# Patient Record
Sex: FEMALE | Race: WHITE | NOT HISPANIC OR LATINO | Employment: OTHER | URBAN - METROPOLITAN AREA
[De-identification: names, ages, dates, MRNs, and addresses within clinical notes are randomized per-mention and may not be internally consistent; named-entity substitution may affect disease eponyms.]

---

## 2019-02-28 ENCOUNTER — HOSPITAL ENCOUNTER (INPATIENT)
Facility: HOSPITAL | Age: 84
LOS: 6 days | Discharge: HOME | End: 2019-03-06
Attending: HOSPITALIST | Admitting: INTERNAL MEDICINE

## 2019-02-28 RX ORDER — LORATADINE 10 MG/1
10 TABLET ORAL DAILY
COMMUNITY
End: 2021-06-05

## 2019-02-28 RX ORDER — HYDROCHLOROTHIAZIDE 12.5 MG/1
12.5 TABLET ORAL DAILY
COMMUNITY
End: 2021-06-05

## 2019-02-28 RX ORDER — TAMOXIFEN CITRATE 20 MG/1
20 TABLET ORAL DAILY
COMMUNITY

## 2019-02-28 RX ORDER — IBUPROFEN 200 MG
200 TABLET ORAL EVERY 6 HOURS PRN
COMMUNITY
End: 2019-03-06 | Stop reason: HOSPADM

## 2019-03-01 ENCOUNTER — APPOINTMENT (OUTPATIENT)
Dept: GENERAL RADIOLOGY | Facility: HOSPITAL | Age: 84
End: 2019-03-01

## 2019-03-01 PROBLEM — A41.9 SEPSIS (HCC): Status: ACTIVE | Noted: 2019-03-01

## 2019-03-01 LAB
ALBUMIN SERPL-MCNC: 3.4 G/DL (ref 3.5–5.2)
ALBUMIN/GLOB SERPL: 1.3 G/DL
ALP SERPL-CCNC: 53 U/L (ref 40–129)
ALT SERPL W P-5'-P-CCNC: 12 U/L (ref 5–33)
ANION GAP SERPL CALCULATED.3IONS-SCNC: 9.5 MMOL/L
AST SERPL-CCNC: 15 U/L (ref 5–32)
B PARAPERT DNA SPEC QL NAA+PROBE: NOT DETECTED
B PERT DNA SPEC QL NAA+PROBE: NOT DETECTED
BASOPHILS # BLD AUTO: 0.02 10*3/MM3 (ref 0–0.2)
BASOPHILS # BLD AUTO: 0.02 10*3/MM3 (ref 0–0.2)
BASOPHILS NFR BLD AUTO: 0.1 % (ref 0–1.5)
BASOPHILS NFR BLD AUTO: 0.2 % (ref 0–1.5)
BILIRUB SERPL-MCNC: 0.5 MG/DL (ref 0.2–1.2)
BUN BLD-MCNC: 21 MG/DL (ref 8–23)
BUN/CREAT SERPL: 16.4 (ref 7–25)
C PNEUM DNA NPH QL NAA+NON-PROBE: NOT DETECTED
CALCIUM SPEC-SCNC: 8 MG/DL (ref 8.8–10.5)
CHLORIDE SERPL-SCNC: 106 MMOL/L (ref 98–107)
CO2 SERPL-SCNC: 24.5 MMOL/L (ref 22–29)
CREAT BLD-MCNC: 1.28 MG/DL (ref 0.57–1)
D-LACTATE SERPL-SCNC: 1.5 MMOL/L (ref 0.5–2)
DEPRECATED RDW RBC AUTO: 44.3 FL (ref 37–54)
DEPRECATED RDW RBC AUTO: 44.5 FL (ref 37–54)
EOSINOPHIL # BLD AUTO: 0 10*3/MM3 (ref 0–0.4)
EOSINOPHIL # BLD AUTO: 0.07 10*3/MM3 (ref 0–0.4)
EOSINOPHIL NFR BLD AUTO: 0 % (ref 0.3–6.2)
EOSINOPHIL NFR BLD AUTO: 0.6 % (ref 0.3–6.2)
ERYTHROCYTE [DISTWIDTH] IN BLOOD BY AUTOMATED COUNT: 12.7 % (ref 12.3–15.4)
ERYTHROCYTE [DISTWIDTH] IN BLOOD BY AUTOMATED COUNT: 12.9 % (ref 12.3–15.4)
FLUAV H1 2009 PAND RNA NPH QL NAA+PROBE: NOT DETECTED
FLUAV H1 HA GENE NPH QL NAA+PROBE: NOT DETECTED
FLUAV H3 RNA NPH QL NAA+PROBE: NOT DETECTED
FLUAV SUBTYP SPEC NAA+PROBE: NOT DETECTED
FLUBV RNA ISLT QL NAA+PROBE: NOT DETECTED
GFR SERPL CREATININE-BSD FRML MDRD: 39 ML/MIN/1.73
GLOBULIN UR ELPH-MCNC: 2.7 GM/DL
GLUCOSE BLD-MCNC: 143 MG/DL (ref 65–99)
GLUCOSE BLDC GLUCOMTR-MCNC: 104 MG/DL (ref 70–130)
GLUCOSE BLDC GLUCOMTR-MCNC: 147 MG/DL (ref 70–130)
GLUCOSE BLDC GLUCOMTR-MCNC: 148 MG/DL (ref 70–130)
GLUCOSE BLDC GLUCOMTR-MCNC: 97 MG/DL (ref 70–130)
GLUCOSE BLDC GLUCOMTR-MCNC: 99 MG/DL (ref 70–130)
HADV DNA SPEC NAA+PROBE: NOT DETECTED
HBA1C MFR BLD: 5.5 % (ref 4.8–5.6)
HCOV 229E RNA SPEC QL NAA+PROBE: NOT DETECTED
HCOV HKU1 RNA SPEC QL NAA+PROBE: NOT DETECTED
HCOV NL63 RNA SPEC QL NAA+PROBE: NOT DETECTED
HCOV OC43 RNA SPEC QL NAA+PROBE: NOT DETECTED
HCT VFR BLD AUTO: 35 % (ref 34–46.6)
HCT VFR BLD AUTO: 37.6 % (ref 34–46.6)
HGB BLD-MCNC: 11.3 G/DL (ref 12–15.9)
HGB BLD-MCNC: 12.1 G/DL (ref 12–15.9)
HMPV RNA NPH QL NAA+NON-PROBE: NOT DETECTED
HPIV1 RNA SPEC QL NAA+PROBE: NOT DETECTED
HPIV2 RNA SPEC QL NAA+PROBE: NOT DETECTED
HPIV3 RNA NPH QL NAA+PROBE: NOT DETECTED
HPIV4 P GENE NPH QL NAA+PROBE: NOT DETECTED
IMM GRANULOCYTES # BLD AUTO: 0.03 10*3/MM3 (ref 0–0.05)
IMM GRANULOCYTES # BLD AUTO: 0.06 10*3/MM3 (ref 0–0.05)
IMM GRANULOCYTES NFR BLD AUTO: 0.3 % (ref 0–0.5)
IMM GRANULOCYTES NFR BLD AUTO: 0.4 % (ref 0–0.5)
LYMPHOCYTES # BLD AUTO: 1.21 10*3/MM3 (ref 0.7–3.1)
LYMPHOCYTES # BLD AUTO: 1.5 10*3/MM3 (ref 0.7–3.1)
LYMPHOCYTES NFR BLD AUTO: 13 % (ref 19.6–45.3)
LYMPHOCYTES NFR BLD AUTO: 7.9 % (ref 19.6–45.3)
M PNEUMO IGG SER IA-ACNC: NOT DETECTED
MAGNESIUM SERPL-MCNC: 1.9 MG/DL (ref 1.7–2.5)
MAGNESIUM SERPL-MCNC: 2 MG/DL (ref 1.7–2.5)
MCH RBC QN AUTO: 30.6 PG (ref 26.6–33)
MCH RBC QN AUTO: 30.7 PG (ref 26.6–33)
MCHC RBC AUTO-ENTMCNC: 32.2 G/DL (ref 31.5–35.7)
MCHC RBC AUTO-ENTMCNC: 32.3 G/DL (ref 31.5–35.7)
MCV RBC AUTO: 94.9 FL (ref 79–97)
MCV RBC AUTO: 95.4 FL (ref 79–97)
MONOCYTES # BLD AUTO: 0.72 10*3/MM3 (ref 0.1–0.9)
MONOCYTES # BLD AUTO: 0.76 10*3/MM3 (ref 0.1–0.9)
MONOCYTES NFR BLD AUTO: 4.7 % (ref 5–12)
MONOCYTES NFR BLD AUTO: 6.6 % (ref 5–12)
NEUTROPHILS # BLD AUTO: 13.24 10*3/MM3 (ref 1.4–7)
NEUTROPHILS # BLD AUTO: 9.12 10*3/MM3 (ref 1.4–7)
NEUTROPHILS NFR BLD AUTO: 79.3 % (ref 42.7–76)
NEUTROPHILS NFR BLD AUTO: 86.9 % (ref 42.7–76)
NRBC BLD AUTO-RTO: 0 /100 WBC (ref 0–0)
NRBC BLD AUTO-RTO: 0 /100 WBC (ref 0–0)
PHOSPHATE SERPL-MCNC: 2.8 MG/DL (ref 2.7–4.5)
PHOSPHATE SERPL-MCNC: 2.9 MG/DL (ref 2.7–4.5)
PLATELET # BLD AUTO: 179 10*3/MM3 (ref 140–450)
PLATELET # BLD AUTO: 202 10*3/MM3 (ref 140–450)
PMV BLD AUTO: 9.6 FL (ref 6–12)
PMV BLD AUTO: 9.9 FL (ref 6–12)
POTASSIUM BLD-SCNC: 4.2 MMOL/L (ref 3.5–5.2)
PROCALCITONIN SERPL-MCNC: 2.26 NG/ML (ref 0.1–0.25)
PROT SERPL-MCNC: 6.1 G/DL (ref 6–8.5)
RBC # BLD AUTO: 3.69 10*6/MM3 (ref 3.77–5.28)
RBC # BLD AUTO: 3.94 10*6/MM3 (ref 3.77–5.28)
RHINOVIRUS RNA SPEC NAA+PROBE: NOT DETECTED
RSV RNA NPH QL NAA+NON-PROBE: NOT DETECTED
SODIUM BLD-SCNC: 140 MMOL/L (ref 136–145)
TROPONIN T SERPL-MCNC: <0.01 NG/ML (ref 0–0.03)
TROPONIN T SERPL-MCNC: <0.01 NG/ML (ref 0–0.03)
WBC NRBC COR # BLD: 11.5 10*3/MM3 (ref 3.4–10.8)
WBC NRBC COR # BLD: 15.25 10*3/MM3 (ref 3.4–10.8)

## 2019-03-01 PROCEDURE — 94640 AIRWAY INHALATION TREATMENT: CPT

## 2019-03-01 PROCEDURE — 83605 ASSAY OF LACTIC ACID: CPT | Performed by: HOSPITALIST

## 2019-03-01 PROCEDURE — 87581 M.PNEUMON DNA AMP PROBE: CPT | Performed by: HOSPITALIST

## 2019-03-01 PROCEDURE — 87631 RESP VIRUS 3-5 TARGETS: CPT | Performed by: HOSPITALIST

## 2019-03-01 PROCEDURE — 84484 ASSAY OF TROPONIN QUANT: CPT | Performed by: HOSPITALIST

## 2019-03-01 PROCEDURE — 25010000002 ONDANSETRON PER 1 MG: Performed by: HOSPITALIST

## 2019-03-01 PROCEDURE — 25010000002 ENOXAPARIN PER 10 MG: Performed by: HOSPITALIST

## 2019-03-01 PROCEDURE — 83735 ASSAY OF MAGNESIUM: CPT | Performed by: HOSPITALIST

## 2019-03-01 PROCEDURE — 74019 RADEX ABDOMEN 2 VIEWS: CPT

## 2019-03-01 PROCEDURE — 85025 COMPLETE CBC W/AUTO DIFF WBC: CPT | Performed by: HOSPITALIST

## 2019-03-01 PROCEDURE — 25010000002 HYDROMORPHONE PER 4 MG: Performed by: HOSPITALIST

## 2019-03-01 PROCEDURE — 84100 ASSAY OF PHOSPHORUS: CPT | Performed by: HOSPITALIST

## 2019-03-01 PROCEDURE — 93005 ELECTROCARDIOGRAM TRACING: CPT | Performed by: HOSPITALIST

## 2019-03-01 PROCEDURE — 87798 DETECT AGENT NOS DNA AMP: CPT | Performed by: HOSPITALIST

## 2019-03-01 PROCEDURE — 87486 CHLMYD PNEUM DNA AMP PROBE: CPT | Performed by: HOSPITALIST

## 2019-03-01 PROCEDURE — 84145 PROCALCITONIN (PCT): CPT | Performed by: HOSPITALIST

## 2019-03-01 PROCEDURE — 80053 COMPREHEN METABOLIC PANEL: CPT | Performed by: HOSPITALIST

## 2019-03-01 PROCEDURE — 25010000002 LEVOFLOXACIN PER 250 MG: Performed by: HOSPITALIST

## 2019-03-01 PROCEDURE — 82962 GLUCOSE BLOOD TEST: CPT

## 2019-03-01 PROCEDURE — 94799 UNLISTED PULMONARY SVC/PX: CPT

## 2019-03-01 PROCEDURE — 93010 ELECTROCARDIOGRAM REPORT: CPT | Performed by: INTERNAL MEDICINE

## 2019-03-01 PROCEDURE — 99221 1ST HOSP IP/OBS SF/LOW 40: CPT | Performed by: SURGERY

## 2019-03-01 PROCEDURE — 83036 HEMOGLOBIN GLYCOSYLATED A1C: CPT | Performed by: HOSPITALIST

## 2019-03-01 PROCEDURE — 99221 1ST HOSP IP/OBS SF/LOW 40: CPT | Performed by: HOSPITALIST

## 2019-03-01 PROCEDURE — 25010000002 VANCOMYCIN 750 MG RECONSTITUTED SOLUTION 1 EACH VIAL: Performed by: HOSPITALIST

## 2019-03-01 PROCEDURE — 87040 BLOOD CULTURE FOR BACTERIA: CPT | Performed by: HOSPITALIST

## 2019-03-01 RX ORDER — ASPIRIN 81 MG/1
81 TABLET ORAL DAILY
Status: DISCONTINUED | OUTPATIENT
Start: 2019-03-01 | End: 2019-03-01

## 2019-03-01 RX ORDER — BUDESONIDE AND FORMOTEROL FUMARATE DIHYDRATE 80; 4.5 UG/1; UG/1
2 AEROSOL RESPIRATORY (INHALATION)
Status: DISCONTINUED | OUTPATIENT
Start: 2019-03-01 | End: 2019-03-06 | Stop reason: HOSPADM

## 2019-03-01 RX ORDER — FAMOTIDINE 20 MG/1
20 TABLET, FILM COATED ORAL 2 TIMES DAILY
Status: DISCONTINUED | OUTPATIENT
Start: 2019-03-01 | End: 2019-03-01

## 2019-03-01 RX ORDER — POLYVINYL ALCOHOL 14 MG/ML
1 SOLUTION/ DROPS OPHTHALMIC 2 TIMES DAILY
Status: DISCONTINUED | OUTPATIENT
Start: 2019-03-01 | End: 2019-03-06 | Stop reason: HOSPADM

## 2019-03-01 RX ORDER — CLOBETASOL PROPIONATE 0.5 MG/G
1 CREAM TOPICAL 2 TIMES DAILY PRN
Status: DISCONTINUED | OUTPATIENT
Start: 2019-03-01 | End: 2019-03-06 | Stop reason: HOSPADM

## 2019-03-01 RX ORDER — MONTELUKAST SODIUM 10 MG/1
10 TABLET ORAL NIGHTLY
Status: DISCONTINUED | OUTPATIENT
Start: 2019-03-01 | End: 2019-03-01

## 2019-03-01 RX ORDER — TAMOXIFEN CITRATE 10 MG/1
20 TABLET ORAL DAILY
Status: DISCONTINUED | OUTPATIENT
Start: 2019-03-01 | End: 2019-03-01

## 2019-03-01 RX ORDER — DEXTROSE MONOHYDRATE 25 G/50ML
25 INJECTION, SOLUTION INTRAVENOUS
Status: DISCONTINUED | OUTPATIENT
Start: 2019-03-01 | End: 2019-03-06 | Stop reason: HOSPADM

## 2019-03-01 RX ORDER — SODIUM CHLORIDE 0.9 % (FLUSH) 0.9 %
3-10 SYRINGE (ML) INJECTION AS NEEDED
Status: DISCONTINUED | OUTPATIENT
Start: 2019-03-01 | End: 2019-03-06 | Stop reason: HOSPADM

## 2019-03-01 RX ORDER — LEVOFLOXACIN 5 MG/ML
750 INJECTION, SOLUTION INTRAVENOUS
Status: DISCONTINUED | OUTPATIENT
Start: 2019-03-01 | End: 2019-03-03

## 2019-03-01 RX ORDER — SODIUM CHLORIDE 0.9 % (FLUSH) 0.9 %
3 SYRINGE (ML) INJECTION EVERY 12 HOURS SCHEDULED
Status: DISCONTINUED | OUTPATIENT
Start: 2019-03-01 | End: 2019-03-06 | Stop reason: HOSPADM

## 2019-03-01 RX ORDER — TAMOXIFEN CITRATE 20 MG/1
20 TABLET ORAL DAILY
Status: DISCONTINUED | OUTPATIENT
Start: 2019-03-01 | End: 2019-03-01

## 2019-03-01 RX ORDER — ONDANSETRON 4 MG/1
4 TABLET, ORALLY DISINTEGRATING ORAL EVERY 6 HOURS PRN
Status: DISCONTINUED | OUTPATIENT
Start: 2019-03-01 | End: 2019-03-06 | Stop reason: HOSPADM

## 2019-03-01 RX ORDER — NITROGLYCERIN 0.4 MG/1
0.4 TABLET SUBLINGUAL
Status: DISCONTINUED | OUTPATIENT
Start: 2019-03-01 | End: 2019-03-06 | Stop reason: HOSPADM

## 2019-03-01 RX ORDER — CHOLECALCIFEROL (VITAMIN D3) 125 MCG
5 CAPSULE ORAL NIGHTLY PRN
Status: DISCONTINUED | OUTPATIENT
Start: 2019-03-01 | End: 2019-03-01

## 2019-03-01 RX ORDER — BISACODYL 10 MG
10 SUPPOSITORY, RECTAL RECTAL DAILY
Status: DISCONTINUED | OUTPATIENT
Start: 2019-03-01 | End: 2019-03-03

## 2019-03-01 RX ORDER — SODIUM CHLORIDE 9 MG/ML
INJECTION, SOLUTION INTRAVENOUS
Status: COMPLETED
Start: 2019-03-01 | End: 2019-03-01

## 2019-03-01 RX ORDER — HYDROMORPHONE HCL 110MG/55ML
0.5 PATIENT CONTROLLED ANALGESIA SYRINGE INTRAVENOUS
Status: DISCONTINUED | OUTPATIENT
Start: 2019-03-01 | End: 2019-03-01

## 2019-03-01 RX ORDER — ONDANSETRON 2 MG/ML
4 INJECTION INTRAMUSCULAR; INTRAVENOUS EVERY 6 HOURS PRN
Status: DISCONTINUED | OUTPATIENT
Start: 2019-03-01 | End: 2019-03-06 | Stop reason: HOSPADM

## 2019-03-01 RX ORDER — ONDANSETRON 4 MG/1
4 TABLET, FILM COATED ORAL EVERY 6 HOURS PRN
Status: DISCONTINUED | OUTPATIENT
Start: 2019-03-01 | End: 2019-03-06 | Stop reason: HOSPADM

## 2019-03-01 RX ORDER — TAMOXIFEN CITRATE 10 MG/1
20 TABLET ORAL DAILY
Status: DISCONTINUED | OUTPATIENT
Start: 2019-03-02 | End: 2019-03-01 | Stop reason: CLARIF

## 2019-03-01 RX ORDER — LEVOTHYROXINE SODIUM 0.05 MG/1
50 TABLET ORAL
Status: DISCONTINUED | OUTPATIENT
Start: 2019-03-01 | End: 2019-03-01

## 2019-03-01 RX ORDER — ACETAMINOPHEN 325 MG/1
650 TABLET ORAL EVERY 4 HOURS PRN
Status: DISCONTINUED | OUTPATIENT
Start: 2019-03-01 | End: 2019-03-01

## 2019-03-01 RX ORDER — CYCLOSPORINE 0.5 MG/ML
1 EMULSION OPHTHALMIC 2 TIMES DAILY
Status: DISCONTINUED | OUTPATIENT
Start: 2019-03-01 | End: 2019-03-01 | Stop reason: CLARIF

## 2019-03-01 RX ORDER — NICOTINE POLACRILEX 4 MG
15 LOZENGE BUCCAL
Status: DISCONTINUED | OUTPATIENT
Start: 2019-03-01 | End: 2019-03-06 | Stop reason: HOSPADM

## 2019-03-01 RX ORDER — ALBUTEROL SULFATE 2.5 MG/3ML
2.5 SOLUTION RESPIRATORY (INHALATION) EVERY 4 HOURS PRN
Status: DISCONTINUED | OUTPATIENT
Start: 2019-03-01 | End: 2019-03-06 | Stop reason: HOSPADM

## 2019-03-01 RX ORDER — SODIUM CHLORIDE 9 MG/ML
40 INJECTION, SOLUTION INTRAVENOUS AS NEEDED
Status: DISCONTINUED | OUTPATIENT
Start: 2019-03-01 | End: 2019-03-06 | Stop reason: HOSPADM

## 2019-03-01 RX ORDER — CETIRIZINE HYDROCHLORIDE 10 MG/1
10 TABLET ORAL DAILY
Status: DISCONTINUED | OUTPATIENT
Start: 2019-03-01 | End: 2019-03-01

## 2019-03-01 RX ORDER — LOSARTAN POTASSIUM 50 MG/1
50 TABLET ORAL DAILY
Status: DISCONTINUED | OUTPATIENT
Start: 2019-03-01 | End: 2019-03-01

## 2019-03-01 RX ORDER — TRAMADOL HYDROCHLORIDE 50 MG/1
50 TABLET ORAL EVERY 6 HOURS PRN
Status: DISCONTINUED | OUTPATIENT
Start: 2019-03-01 | End: 2019-03-06 | Stop reason: HOSPADM

## 2019-03-01 RX ORDER — AZTREONAM 1 G/1
INJECTION, POWDER, LYOPHILIZED, FOR SOLUTION INTRAMUSCULAR; INTRAVENOUS
Status: COMPLETED
Start: 2019-03-01 | End: 2019-03-01

## 2019-03-01 RX ADMIN — AZTREONAM 2 G: 2 INJECTION, POWDER, LYOPHILIZED, FOR SOLUTION INTRAMUSCULAR; INTRAVENOUS at 12:09

## 2019-03-01 RX ADMIN — METRONIDAZOLE 500 MG: 500 INJECTION, SOLUTION INTRAVENOUS at 17:53

## 2019-03-01 RX ADMIN — SODIUM CHLORIDE, PRESERVATIVE FREE 3 ML: 5 INJECTION INTRAVENOUS at 20:50

## 2019-03-01 RX ADMIN — SODIUM CHLORIDE: 900 INJECTION, SOLUTION INTRAVENOUS at 03:19

## 2019-03-01 RX ADMIN — SODIUM CHLORIDE, PRESERVATIVE FREE 3 ML: 5 INJECTION INTRAVENOUS at 08:23

## 2019-03-01 RX ADMIN — BUDESONIDE AND FORMOTEROL FUMARATE DIHYDRATE 2 PUFF: 80; 4.5 AEROSOL RESPIRATORY (INHALATION) at 21:06

## 2019-03-01 RX ADMIN — ACETAMINOPHEN 650 MG: 325 TABLET, FILM COATED ORAL at 03:05

## 2019-03-01 RX ADMIN — LEVOFLOXACIN 750 MG: 5 INJECTION, SOLUTION INTRAVENOUS at 19:31

## 2019-03-01 RX ADMIN — HYDROMORPHONE HYDROCHLORIDE 0.5 MG: 2 INJECTION, SOLUTION INTRAMUSCULAR; INTRAVENOUS; SUBCUTANEOUS at 16:02

## 2019-03-01 RX ADMIN — BISACODYL 10 MG: 10 SUPPOSITORY RECTAL at 16:53

## 2019-03-01 RX ADMIN — LINAGLIPTIN 5 MG: 5 TABLET, FILM COATED ORAL at 08:21

## 2019-03-01 RX ADMIN — BUDESONIDE AND FORMOTEROL FUMARATE DIHYDRATE 2 PUFF: 80; 4.5 AEROSOL RESPIRATORY (INHALATION) at 09:41

## 2019-03-01 RX ADMIN — ASPIRIN 81 MG: 81 TABLET ORAL at 08:21

## 2019-03-01 RX ADMIN — FAMOTIDINE 20 MG: 20 TABLET ORAL at 08:21

## 2019-03-01 RX ADMIN — CETIRIZINE HYDROCHLORIDE 10 MG: 10 TABLET, FILM COATED ORAL at 08:21

## 2019-03-01 RX ADMIN — SODIUM CHLORIDE, PRESERVATIVE FREE 3 ML: 5 INJECTION INTRAVENOUS at 03:07

## 2019-03-01 RX ADMIN — VANCOMYCIN HYDROCHLORIDE 1500 MG: 750 INJECTION, POWDER, LYOPHILIZED, FOR SOLUTION INTRAVENOUS at 08:20

## 2019-03-01 RX ADMIN — TAMOXIFEN CITRATE 20 MG: 20 TABLET ORAL at 12:12

## 2019-03-01 RX ADMIN — CLOBETASOL PROPIONATE 1 APPLICATION: 0.5 CREAM TOPICAL at 08:21

## 2019-03-01 RX ADMIN — POLYVINYL ALCOHOL 1 DROP: 14 SOLUTION/ DROPS OPHTHALMIC at 20:49

## 2019-03-01 RX ADMIN — ENOXAPARIN SODIUM 40 MG: 100 INJECTION SUBCUTANEOUS at 08:21

## 2019-03-01 RX ADMIN — LEVOTHYROXINE SODIUM 50 MCG: 50 TABLET ORAL at 06:03

## 2019-03-01 RX ADMIN — POLYVINYL ALCOHOL 1 DROP: 14 SOLUTION/ DROPS OPHTHALMIC at 08:21

## 2019-03-01 RX ADMIN — TRAMADOL HYDROCHLORIDE 50 MG: 50 TABLET, FILM COATED ORAL at 22:17

## 2019-03-01 RX ADMIN — ONDANSETRON 4 MG: 2 INJECTION, SOLUTION INTRAMUSCULAR; INTRAVENOUS at 16:02

## 2019-03-01 RX ADMIN — MONTELUKAST SODIUM 10 MG: 10 TABLET, FILM COATED ORAL at 03:05

## 2019-03-01 RX ADMIN — AZTREONAM 2 G: 1 INJECTION, POWDER, LYOPHILIZED, FOR SOLUTION INTRAMUSCULAR; INTRAVENOUS at 03:17

## 2019-03-01 RX ADMIN — ACETAMINOPHEN 650 MG: 325 TABLET, FILM COATED ORAL at 14:57

## 2019-03-01 RX ADMIN — LOSARTAN POTASSIUM 50 MG: 50 TABLET ORAL at 08:21

## 2019-03-01 NOTE — H&P
[facility] HOSPITALIST     Derrick Lipscomb MD    CHIEF COMPLAINT: Abdominal pain    HISTORY OF PRESENT ILLNESS:    This patient came to [facility] ER with sudden onset of abdominal pain at home and it gradually worsened as the day went on.  She had spent several hours in the emergency room at [facility] and they felt that they would get a room because their beds were full but she said they could not find her room so they called [facility] for transfer when they realized they could not get her a bed.  Her abdominal pain is mildly improved with the antibiotics that were given.  She is allergic to penicillin.  The CT scan of her abdomen is negative chest x-ray is negative urinalysis was negative but her white blood cell count is elevated.  Lactic acid level is elevated she was febrile and tachycardic.  Patient appears to have sepsis from an unknown organism and sepsis protocol was ordered with aztreonam and vancomycin.      Past Medical History:   Diagnosis Date   • Arthritis    • Asthma    • Breathing problem    • Cancer (CMS/HCC)    • Constipation    • Diabetes (CMS/HCC)    • Fatigue    • GERD (gastroesophageal reflux disease)    • Headache    • History of allergy    • Hypertension    • Irregular bowel habits    • Lichen sclerosus    • Pregnancy, ectopic    • Seizures (CMS/HCC)    • Shortness of breath    • Thyroid trouble      Past Surgical History:   Procedure Laterality Date   • BREAST LUMPECTOMY     • CARDIAC CATHETERIZATION     • COLONOSCOPY     • HEMORRHOIDECTOMY     • HIP SURGERY  2011   • HYSTERECTOMY     • LYMPH NODE BIOPSY      excisional    • MASTECTOMY, PARTIAL Left    • OTHER SURGICAL HISTORY      Closed treatment acromioclavicular dislocation, manipulation   • TUBAL ABDOMINAL LIGATION      when she was 48     Family History   Problem Relation Age of Onset   • Emphysema Father    • Diabetes Other    • Hypertension Other    • Thyroid disease Other      Social History      Tobacco Use   • Smoking status: Never Smoker   Substance Use Topics   • Alcohol use: No   • Drug use: Not on file     Medications Prior to Admission   Medication Sig Dispense Refill Last Dose   • albuterol (PROVENTIL HFA;VENTOLIN HFA) 108 (90 BASE) MCG/ACT inhaler 2 puffs Every 4 (Four) Hours As Needed. Albuterol Sulfate  MCG/ACT AERS; Patient Sig: Albuterol Sulfate  MCG/ACT AERS ; 0; 04-Mar-2015; Active   Taking   • budesonide-formoterol (SYMBICORT) 80-4.5 MCG/ACT inhaler 2 puffs As Needed. Symbicort 80-4.5 MCG/ACT Inhalation Aerosol; Patient Sig: Symbicort 80-4.5 MCG/ACT Inhalation Aerosol ; 0; 23-Oct-2014; Active   Taking   • Cephalexin (KEFLEX PO) Take 500 mg by mouth Daily.   Taking   • clobetasol (TEMOVATE) 0.05 % cream Apply 1 application topically to the appropriate area as directed As Needed.   Taking   • CycloSPORINE (RESTASIS OP) Apply 2 drops to eye(s) as directed by provider As Needed.   Taking   • hydrochlorothiazide (HYDRODIURIL) 12.5 MG tablet Take 12.5 mg by mouth Daily.      • ibuprofen (ADVIL,MOTRIN) 200 MG tablet Take 200 mg by mouth Every 6 (Six) Hours As Needed for Mild Pain .      • levothyroxine (SYNTHROID, LEVOTHROID) 50 MCG tablet Take 50 mcg by mouth Daily.   Taking   • loratadine (CLARITIN) 10 MG tablet Take 10 mg by mouth Daily.      • losartan (COZAAR) 100 MG tablet Take 50 mg by mouth Daily.   Taking   • ranitidine (ZANTAC) 150 MG tablet Take 150 mg by mouth 2 (Two) Times a Day.   Taking   • sitaGLIPtin (JANUVIA) 100 MG tablet Take 100 mg by mouth Daily.   Taking   • tacrolimus (PROTOPIC) 0.1 % ointment Apply  topically to the appropriate area as directed As Needed.   Taking   • tamoxifen (NOLVADEX) 20 MG chemo tablet       • acetaminophen (TYLENOL) 325 MG tablet Take  by mouth.   Taking   • Aspirin (ASPIR-81 PO) Take  by mouth.   Taking   • montelukast (SINGULAIR) 10 MG tablet Take  by mouth.   Taking   • nabumetone (RELAFEN) 750 MG tablet Take  by mouth.   Not  "Taking     Allergies:  Penicillins and Phenobarbital    REVIEW OF SYSTEMS:  Please see the above history of present illness for pertinent positives and negatives.  The remainder of the patient's systems have been reviewed and are negative.  Patient does not have nausea and vomiting but she does have abdominal pain she does not have diarrhea at this time.  She denies hematuria or dysuria she denies headache.  She does not have chest pain or shortness of air    Vital Signs  Temp:  [98 °F (36.7 °C)] 98 °F (36.7 °C)  Heart Rate:  [69] 69  Resp:  [18] 18  BP: (152)/(69) 152/69  Oxygen Therapy  SpO2: 94 %  Pulse Oximetry Type: Intermittent  Device (Oxygen Therapy): room air}  Body mass index is 30.12 kg/m².  Flowsheet Rows      First Filed Value   Admission Height  165.1 cm (65\") Documented at 02/28/2019 2222   Admission Weight  82.1 kg (181 lb) Documented at 02/28/2019 2222             Physical Exam:  Physical Exam   Constitutional: Patient appears well-developed and well-nourished and in mild acute distress from abdominal pain.  Per patient it is worse when she tries to move such as getting up to bathroom.  HEENT:   Head: Normocephalic and atraumatic.   Eyes:  Pupils are equal, round, and reactive to light. EOM are intact. Sclerae are anicteric and noninjected.  Mouth and Throat: Patient has moist mucous membranes. Oropharynx is clear of any erythema or exudate.     Neck: Neck supple. No JVD present. No thyromegaly present. No lymphadenopathy present.  Cardiovascular: Regular rate, regular rhythm, S1 normal and S2 normal.  Exam reveals no gallop and no friction rub.  No murmur heard.  Pulmonary/Chest: Lungs are clear to auscultation bilaterally. No respiratory distress. No wheezes. No rhonchi. No rales.   Abdominal: Sof but tender to touch throughout.  Bowel sounds are normal. No distension and no mass. There is no hepatosplenomegaly. There is no tenderness.   Musculoskeletal: Normal muscle tone  Extremities: No edema. " Pulses are palpable in all 4 extremities.  Neurological: Patient is alert and oriented to person, place, and time. Cranial nerves II-XII are grossly intact with no focal deficits.  Skin: Skin is warm. No rash noted. Nails show no clubbing.  No cyanosis or erythema.    Emotional Behavior:    Judgement and Insight: Good   Mental Status:  Alertness  Good   Memory:  good   Mood and Affect:         Depression  None               Anxiety  None    Debilities:   Physical Weakness  None   Handicaps  None   Disabilities  NONE   Agitation  None     Results Review:    I reviewed the patient's new clinical results.  Lab Results (most recent)     None          Imaging Results (most recent)     None        not reviewed    ECG/EMG Results (most recent)     None        not reviewed    Assessment/Plan     Sepsis of unknown source   CT abdomen negative   CXR negative   Urine negative/ all per ER Doctor in [place]/ Dr. Ramos  Patient with Fever, Elevated WBC, Elevated Lactic acid level, aches and pains.    OA    Asthma    Diabetes 2    Body mass index is 30.12 kg/m²./ Obesity    GERD    Essential Hypertension    Seizure disorder    Hypothyroidisim    H/O Breast Cancer and Mastectomy            I discussed the patients findings and my recommendations with patient. We will consult surgery and order sepsis protocol for unknown organism or organ system.    Bethany Tinoco DO  03/01/19  1:27 AM    Time: 35 min

## 2019-03-01 NOTE — PROGRESS NOTES
"Patient seen and examined and discussed w/ Dr. Garcia.  CT disc was not sent w/ patient...only her CXR.  She has remained afebrile.  Her lactic acid is normal.  She is tender in the LLQ.  Plain film of the abdomen demonstrates large stool burden.  No free air.  Will treat as diverticulitis, but CT reported as \"negative\".  Called [facility] to have another disc sent, but they can only mail it.  I will have them fax a copy of the report.  Make NPO for now and follow.  "

## 2019-03-01 NOTE — NURSING NOTE
Discharge Planning Assessment  JOSE G Dean     Patient Name: Syhanne Dan  MRN: [id number]  Today's Date: 3/1/2019    Admit Date: 2/28/2019    Discharge Needs Assessment     Row Name 03/01/19 1453       Living Environment    Lives With  spouse    Current Living Arrangements  home/apartment/condo    Primary Care Provided by  self    Provides Primary Care For  spouse    Family Caregiver if Needed  child(jessica), adult;grandchild(jessica), adult    Quality of Family Relationships  helpful;involved;supportive    Able to Return to Prior Arrangements  yes       Resource/Environmental Concerns    Resource/Environmental Concerns  none    Transportation Concerns  car, none       Transition Planning    Patient/Family Anticipates Transition to  home with family    Patient/Family Anticipated Services at Transition  none    Transportation Anticipated  car, drives self;family or friend will provide       Discharge Needs Assessment    Readmission Within the Last 30 Days  no previous admission in last 30 days    Equipment Currently Used at Home  none        Discharge Plan     Row Name 03/01/19 1454       Plan    Plan  plan home, assess needs    Patient/Family in Agreement with Plan  yes    Plan Comments  Spoke with patient at bedside, she is sitting up in recliner. Permission to speak with family present. Face sheet verified. Patient is independent of ADLS including driving prior to admission. She is the caregiver for her . She denies use of DME, home 02, cpap/bipap. She currently has [facility] for her  and states they come out twice a week. But she has not used home health or rehab services previously. She gets her medications through [facility] for maintenance meds and uses [facility] in [place] for new prescriptions. She denies issues obtaining medications. She has a living will- encouraged to bring copy to be scanned to medical record. CM # placed on white board, will follow for dc needs.         Destination      No service coordination in this encounter.      Durable Medical Equipment      No service coordination in this encounter.      Dialysis/Infusion      No service coordination in this encounter.      Home Medical Care      No service coordination in this encounter.      Community Resources      No service coordination in this encounter.          Demographic Summary     Row Name 03/01/19 1459       General Information    Admission Type  inpatient    Arrived From  home    Referral Source  admission list    Reason for Consult  discharge planning    Preferred Language  English     Used During This Interaction  no       Contact Information    Permission Granted to Share Info With          Functional Status    No documentation.       Psychosocial    No documentation.       Abuse/Neglect    No documentation.       Legal    No documentation.       Substance Abuse    No documentation.       Patient Forms    No documentation.           Renzo Shannon RN

## 2019-03-01 NOTE — CONSULTS
"Chief complaint lower abdominal pain  I was asked to see this patient by Dr. Hurd  History of present illness this 88-year-old white female complains of left lower quadrant abdominal pain starting yesterday that became more diffuse.  She was in [facility] and then subsequently transferred here I was asked to see her.  She denies any nausea vomiting she says she has had some fevers or chills.  She says that she has had diverticulitis treated on an outpatient basis in the past.  She said she had a colonoscopy done in [place] several years ago that showed diverticular disease.  She denies any blood in her stool or any other change in her stool.  She does have a history of chronic constipation.  She denies any weight loss.  She says she is may be feeling a little bit better today than she was yesterday.  Past medical history is significant for breast surgeries x2 for breast cancer she had a right hip surgery for bursitis she does have diabetes and arthritis.  She had a tubal ligation and hemorrhoidectomy and hysterectomy  cardiac cath  Family history is significant for diabetes hypertension and thyroid disease.  Social history she does not smoke.  She says she has an allergy to penicillin and phenobarbital  Medications at home include albuterol Symbicort  Ibuprofen hydrochlorothiazide Restasis Synthroid Claritin Cozaar Zantac Januvia Protopic tamoxifen Tylenol aspirin Singulair Relafen  Physical exam this alert elderly white female in no active distress  /57 (BP Location: Left arm, Patient Position: Sitting)   Pulse 78   Temp 97.8 °F (36.6 °C) (Oral)   Resp 16   Ht 165.1 cm (65\")   Wt 82.1 kg (181 lb)   SpO2 96%   BMI 30.12 kg/m²   Heart shows regular rate and rhythm lungs are clear and equal.  Her abdomen is soft with mild distention with moderate left lower quadrant tenderness.  Apparently the CT disc from [place] was not sent the CT reading shows diverticulosis without inflammatory " change.  Her white blood count yesterday was 18 and then 15 and today it is 11  Hemoglobin is 11.3  Assessment left lower quadrant abdominal pain probable diverticulitis.  I discussed the case with Dr. Malone.  Her antibiotics have been changed to Levaquin and Flagyl.  I will follow her along with

## 2019-03-01 NOTE — PROGRESS NOTES
Pharmacy was consulted to dose Levaquin for an intra-abdominal infection.      SCr= 1.28  CrCl= 32.1mL/min  WBC= 11.5    Initiate Levaquin 750mg IV q48h.  Pharmacy will continue to follow.    Kelsey Ledezma, PharmD  3/1/2019  3:45 PM

## 2019-03-02 ENCOUNTER — APPOINTMENT (OUTPATIENT)
Dept: CT IMAGING | Facility: HOSPITAL | Age: 84
End: 2019-03-02

## 2019-03-02 LAB
BASOPHILS # BLD AUTO: 0.02 10*3/MM3 (ref 0–0.2)
BASOPHILS NFR BLD AUTO: 0.2 % (ref 0–1.5)
BILIRUB UR QL STRIP: NEGATIVE
CLARITY UR: CLEAR
COLOR UR: YELLOW
DEPRECATED RDW RBC AUTO: 47.3 FL (ref 37–54)
EOSINOPHIL # BLD AUTO: 0.1 10*3/MM3 (ref 0–0.4)
EOSINOPHIL NFR BLD AUTO: 1 % (ref 0.3–6.2)
ERYTHROCYTE [DISTWIDTH] IN BLOOD BY AUTOMATED COUNT: 13.1 % (ref 12.3–15.4)
GLUCOSE BLDC GLUCOMTR-MCNC: 107 MG/DL (ref 70–130)
GLUCOSE BLDC GLUCOMTR-MCNC: 91 MG/DL (ref 70–130)
GLUCOSE BLDC GLUCOMTR-MCNC: 92 MG/DL (ref 70–130)
GLUCOSE BLDC GLUCOMTR-MCNC: 93 MG/DL (ref 70–130)
GLUCOSE BLDC GLUCOMTR-MCNC: 96 MG/DL (ref 70–130)
GLUCOSE UR STRIP-MCNC: NEGATIVE MG/DL
HCT VFR BLD AUTO: 34.2 % (ref 34–46.6)
HGB BLD-MCNC: 10.7 G/DL (ref 12–15.9)
HGB UR QL STRIP.AUTO: NEGATIVE
IMM GRANULOCYTES # BLD AUTO: 0.03 10*3/MM3 (ref 0–0.05)
IMM GRANULOCYTES NFR BLD AUTO: 0.3 % (ref 0–0.5)
KETONES UR QL STRIP: NEGATIVE
LEUKOCYTE ESTERASE UR QL STRIP.AUTO: NEGATIVE
LYMPHOCYTES # BLD AUTO: 1.43 10*3/MM3 (ref 0.7–3.1)
LYMPHOCYTES NFR BLD AUTO: 14.8 % (ref 19.6–45.3)
MCH RBC QN AUTO: 30.9 PG (ref 26.6–33)
MCHC RBC AUTO-ENTMCNC: 31.3 G/DL (ref 31.5–35.7)
MCV RBC AUTO: 98.8 FL (ref 79–97)
MONOCYTES # BLD AUTO: 0.82 10*3/MM3 (ref 0.1–0.9)
MONOCYTES NFR BLD AUTO: 8.5 % (ref 5–12)
NEUTROPHILS # BLD AUTO: 7.23 10*3/MM3 (ref 1.4–7)
NEUTROPHILS NFR BLD AUTO: 75.2 % (ref 42.7–76)
NITRITE UR QL STRIP: NEGATIVE
NRBC BLD AUTO-RTO: 0 /100 WBC (ref 0–0)
PH UR STRIP.AUTO: <=5 [PH] (ref 4.5–8)
PLATELET # BLD AUTO: 170 10*3/MM3 (ref 140–450)
PMV BLD AUTO: 10.2 FL (ref 6–12)
PROT UR QL STRIP: NEGATIVE
RBC # BLD AUTO: 3.46 10*6/MM3 (ref 3.77–5.28)
SP GR UR STRIP: <=1.005 (ref 1–1.03)
UROBILINOGEN UR QL STRIP: NORMAL
WBC NRBC COR # BLD: 9.63 10*3/MM3 (ref 3.4–10.8)

## 2019-03-02 PROCEDURE — 0 DIATRIZOATE MEGLUMINE & SODIUM PER 1 ML: Performed by: HOSPITALIST

## 2019-03-02 PROCEDURE — 99232 SBSQ HOSP IP/OBS MODERATE 35: CPT | Performed by: SURGERY

## 2019-03-02 PROCEDURE — 81003 URINALYSIS AUTO W/O SCOPE: CPT | Performed by: INTERNAL MEDICINE

## 2019-03-02 PROCEDURE — 85025 COMPLETE CBC W/AUTO DIFF WBC: CPT | Performed by: SURGERY

## 2019-03-02 PROCEDURE — 74177 CT ABD & PELVIS W/CONTRAST: CPT

## 2019-03-02 PROCEDURE — 94799 UNLISTED PULMONARY SVC/PX: CPT

## 2019-03-02 PROCEDURE — 82962 GLUCOSE BLOOD TEST: CPT

## 2019-03-02 PROCEDURE — 99232 SBSQ HOSP IP/OBS MODERATE 35: CPT | Performed by: INTERNAL MEDICINE

## 2019-03-02 PROCEDURE — 0 IOPAMIDOL PER 1 ML: Performed by: HOSPITALIST

## 2019-03-02 PROCEDURE — 25010000002 ENOXAPARIN PER 10 MG: Performed by: HOSPITALIST

## 2019-03-02 RX ORDER — SODIUM CHLORIDE, SODIUM LACTATE, POTASSIUM CHLORIDE, CALCIUM CHLORIDE 600; 310; 30; 20 MG/100ML; MG/100ML; MG/100ML; MG/100ML
75 INJECTION, SOLUTION INTRAVENOUS CONTINUOUS
Status: DISCONTINUED | OUTPATIENT
Start: 2019-03-02 | End: 2019-03-04

## 2019-03-02 RX ADMIN — DIATRIZOATE MEGLUMINE AND DIATRIZOATE SODIUM 30 ML: 600; 100 SOLUTION ORAL; RECTAL at 13:30

## 2019-03-02 RX ADMIN — IOPAMIDOL 100 ML: 755 INJECTION, SOLUTION INTRAVENOUS at 15:40

## 2019-03-02 RX ADMIN — BUDESONIDE AND FORMOTEROL FUMARATE DIHYDRATE 2 PUFF: 80; 4.5 AEROSOL RESPIRATORY (INHALATION) at 09:38

## 2019-03-02 RX ADMIN — METRONIDAZOLE 500 MG: 500 INJECTION, SOLUTION INTRAVENOUS at 00:20

## 2019-03-02 RX ADMIN — SODIUM CHLORIDE, PRESERVATIVE FREE 3 ML: 5 INJECTION INTRAVENOUS at 08:36

## 2019-03-02 RX ADMIN — METRONIDAZOLE 500 MG: 500 INJECTION, SOLUTION INTRAVENOUS at 16:00

## 2019-03-02 RX ADMIN — POLYVINYL ALCOHOL 1 DROP: 14 SOLUTION/ DROPS OPHTHALMIC at 20:21

## 2019-03-02 RX ADMIN — METRONIDAZOLE 500 MG: 500 INJECTION, SOLUTION INTRAVENOUS at 08:36

## 2019-03-02 RX ADMIN — TRAMADOL HYDROCHLORIDE 50 MG: 50 TABLET, FILM COATED ORAL at 22:50

## 2019-03-02 RX ADMIN — BISACODYL 10 MG: 10 SUPPOSITORY RECTAL at 08:38

## 2019-03-02 RX ADMIN — POLYVINYL ALCOHOL 1 DROP: 14 SOLUTION/ DROPS OPHTHALMIC at 08:36

## 2019-03-02 RX ADMIN — SODIUM CHLORIDE, POTASSIUM CHLORIDE, SODIUM LACTATE AND CALCIUM CHLORIDE 100 ML/HR: 600; 310; 30; 20 INJECTION, SOLUTION INTRAVENOUS at 22:49

## 2019-03-02 RX ADMIN — BUDESONIDE AND FORMOTEROL FUMARATE DIHYDRATE 2 PUFF: 80; 4.5 AEROSOL RESPIRATORY (INHALATION) at 21:22

## 2019-03-02 RX ADMIN — SODIUM CHLORIDE, POTASSIUM CHLORIDE, SODIUM LACTATE AND CALCIUM CHLORIDE 100 ML/HR: 600; 310; 30; 20 INJECTION, SOLUTION INTRAVENOUS at 11:39

## 2019-03-02 RX ADMIN — SODIUM CHLORIDE, PRESERVATIVE FREE 3 ML: 5 INJECTION INTRAVENOUS at 20:21

## 2019-03-02 RX ADMIN — ENOXAPARIN SODIUM 40 MG: 100 INJECTION SUBCUTANEOUS at 08:36

## 2019-03-02 NOTE — PROGRESS NOTES
He complains of ongoing lower abdominal pain.  She is afebrile vital signs are stable.  She was made n.p.o. but she does not have any IV fluids running.  Her white blood count has normalized.  She is  in the left lower quadrant.  I would continue her intravenous antibiotics continue n.p.o. we will start her on some IV fluids.  Recheck labs in the a.m.  Apparently she was made n.p.o. yesterday but continued to receive a tray

## 2019-03-02 NOTE — PROGRESS NOTES
"Hospitalist Team      Patient Care Team:  Derrick Lipscomb MD as PCP - General  Derrick Lipscomb MD as PCP - Family Medicine        Chief Complaint:  Abdominal pain and follow-up note      Subjective    This patient came to [facility] with sudden onset of abdominal pain at home and it gradually worsened as the day went on.  She had spent several hours in the emergency room at [facility] and they felt that they would get a room because their beds were full but she said they could not find her room so they called [facility] for transfer when they realized they could not get her a bed.  Her abdominal pain is mildly improved with the antibiotics that were given.  She is allergic to penicillin.  The CT scan of her abdomen is negative chest x-ray is negative urinalysis was negative but her white blood cell count is elevated.  Lactic acid level is elevated she was febrile and tachycardic.  Patient appears to have sepsis from an unknown organism and sepsis protocol was ordered with aztreonam and vancomycin.       Interval History and ROS:   Patient at [facility], [place] has been seen by general surgery and at this time suspected diverticulitis ascending getting Levaquin and Flagyl and also getting further workup.      Patient States she is not feeling better and she continues to have abdominal pain and also she has some nausea and has not moved her bowels  Patient Complaints: Continues to complain of abdominal pain  Patient Denies: Vomiting and denies any fever or chills denies any chest pain or shortness of breath  History taken from: patient      Objective    Vital Signs  Temp:  [97.8 °F (36.6 °C)-98.2 °F (36.8 °C)] 97.8 °F (36.6 °C)  Heart Rate:  [71-85] 82  Resp:  [16-20] 20  BP: (106-148)/(54-67) 148/62  Oxygen Therapy  SpO2: 95 %  Pulse Oximetry Type: Intermittent  Device (Oxygen Therapy): room air}  Flowsheet Rows      First Filed Value   Admission Height  165.1 cm (65\") Documented at " 02/28/2019 2222   Admission Weight  82.1 kg (181 lb) Documented at 02/28/2019 2222            Physical Exam:    General Appearance:    Alert, cooperative, in no acute distress   Head:    Normocephalic, without obvious abnormality, atraumatic   Eyes:            Lids and lashes normal, conjunctivae and sclerae normal, no   icterus, no pallor, corneas clear, PERRLA   Ears:    Ears appear intact with no abnormalities noted   Throat:   No oral lesions, no thrush, oral mucosa moist   Neck:   No adenopathy, supple, trachea midline, no thyromegaly, no     carotid bruit, no JVD   Back:     No kyphosis present, no scoliosis present, no skin lesions,       erythema or scars, no tenderness to percussion or                   palpation,   range of motion normal   Lungs:     Clear to auscultation,respirations regular, even and                   unlabored    Heart:    Regular rhythm and normal rate, normal S1 and S2, no            murmur, no gallop, no rub, no click   Breast Exam:    Deferred   Abdomen:     Abdomen is soft but she has significant tenderness across the entire abdomen and she has no rebound tenderness positive .  Bowel sounds are positive.     Genitalia:    Deferred   Extremities:   Moves all extremities well, no edema, no cyanosis, no              redness   Pulses:   Pulses palpable and equal bilaterally   Skin:   No bleeding, bruising or rash   Lymph nodes:   No palpable adenopathy   Neurologic:   Cranial nerves 2 - 12 grossly intact, sensation intact, DTR        present and equal bilaterally       Results Review:     I reviewed the patient's new clinical results.    Lab Results (last 24 hours)     Procedure Component Value Units Date/Time    POC Glucose Once [[id number]]  (Normal) Collected:  03/02/19 1200    Specimen:  Blood Updated:  03/02/19 1206     Glucose 91 mg/dL     POC Glucose Once [[id number]]  (Normal) Collected:  03/02/19 0642    Specimen:  Blood Updated:  03/02/19 0648     Glucose 96 mg/dL     CBC &  Differential [[id number]] Collected:  03/02/19 0458    Specimen:  Blood Updated:  03/02/19 0611    Narrative:       The following orders were created for panel order CBC & Differential.  Procedure                               Abnormality         Status                     ---------                               -----------         ------                     CBC Auto Differential[[id number]]        Abnormal            Final result                 Please view results for these tests on the individual orders.    CBC Auto Differential [[id number]]  (Abnormal) Collected:  03/02/19 0458    Specimen:  Blood Updated:  03/02/19 0611     WBC 9.63 10*3/mm3      RBC 3.46 10*6/mm3      Hemoglobin 10.7 g/dL      Hematocrit 34.2 %      MCV 98.8 fL      MCH 30.9 pg      MCHC 31.3 g/dL      RDW 13.1 %      RDW-SD 47.3 fl      MPV 10.2 fL      Platelets 170 10*3/mm3      Neutrophil % 75.2 %      Lymphocyte % 14.8 %      Monocyte % 8.5 %      Eosinophil % 1.0 %      Basophil % 0.2 %      Immature Grans % 0.3 %      Neutrophils, Absolute 7.23 10*3/mm3      Lymphocytes, Absolute 1.43 10*3/mm3      Monocytes, Absolute 0.82 10*3/mm3      Eosinophils, Absolute 0.10 10*3/mm3      Basophils, Absolute 0.02 10*3/mm3      Immature Grans, Absolute 0.03 10*3/mm3      nRBC 0.0 /100 WBC     Blood Culture - Blood, Hand, Right [[id number]] Collected:  03/01/19 0235    Specimen:  Blood from Hand, Right Updated:  03/02/19 0245     Blood Culture No growth at 24 hours    Blood Culture - Blood, Hand, Right [[id number]] Collected:  03/01/19 0210    Specimen:  Blood from Hand, Right Updated:  03/02/19 0230     Blood Culture No growth at 24 hours    POC Glucose Once [[id number]]  (Normal) Collected:  03/02/19 0012    Specimen:  Blood Updated:  03/02/19 0018     Glucose 107 mg/dL     POC Glucose Once [[id number]]  (Abnormal) Collected:  03/01/19 2038    Specimen:  Blood Updated:  03/01/19 2044     Glucose 148 mg/dL     POC Glucose Once [[id number]]  (Normal)  Collected:  03/01/19 1616    Specimen:  Blood Updated:  03/01/19 1638     Glucose 97 mg/dL           Imaging Results (last 24 hours)     Procedure Component Value Units Date/Time    XR Abdomen Flat & Upright [[id number]] Collected:  03/01/19 1606     Updated:  03/01/19 1609    Narrative:       ABDOMEN 3 VIEWS 3/1/2019     HISTORY:   Generalized abdominal pain for 2 days, worsening left lower quadrant  with associated diarrhea.     FINDINGS:  3 views of the abdomen demonstrate mild gaseous distention of the small  bowel with a normal colonic gas pattern. There is some residual  radiographic contrast seen within the colon. Fecal matter is seen in  colon. Findings most likely reflect ileus or enteritis. There are no  abnormal abdominal calcifications. The bony structures are normal.       Impression:       Mild gaseous distention of the small bowel with normal  colonic gas pattern and large amount of fecal matter in the colon.  Findings may reflect ileus or enteritis. No evidence of free air.     This report was finalized on 3/1/2019 4:07 PM by Dr. Kirill Loaiza MD.             Xray reviewed personally by physician.      ECG reviewed personally by physician  ECG/EMG Results (most recent)     Procedure Component Value Units Date/Time    ECG 12 Lead [[id number]] Collected:  03/01/19 0704     Updated:  03/01/19 0753    Narrative:       HEART RATE= 67  bpm  RR Interval= 892  ms  WI Interval= 200  ms  P Horizontal Axis= 12  deg  P Front Axis= -4  deg  QRSD Interval= 89  ms  QT Interval= 434  ms  QRS Axis= 5  deg  T Wave Axis= -4  deg  - OTHERWISE NORMAL ECG -  Sinus rhythm  Atrial premature complex  Low voltage, precordial leads  No change from prior tracing  Electronically Signed By: Dilia Peter ([facility]) 01-Mar-2019 07:53:38  Date and Time of Study: 2019-03-01 07:04:38          Medication Review:   I have reviewed the patient's current medication list    Current Facility-Administered Medications:   •  albuterol  (PROVENTIL) nebulizer solution 0.083% 2.5 mg/3mL, 2.5 mg, Nebulization, Q4H PRN, Bethany Tinoco DO  •  bisacodyl (DULCOLAX) suppository 10 mg, 10 mg, Rectal, Daily, Tres Malone MD, 10 mg at 03/02/19 0838  •  budesonide-formoterol (SYMBICORT) 80-4.5 MCG/ACT inhaler 2 puff, 2 puff, Inhalation, BID - RT, Bethany Tinoco DO, 2 puff at 03/02/19 0938  •  clobetasol (TEMOVATE) 0.05 % cream 1 application, 1 application, Topical, BID PRN, Bethany Tinoco DO, 1 application at 03/01/19 0821  •  dextrose (D50W) 25 g/ 50mL Intravenous Solution 25 g, 25 g, Intravenous, Q15 Min PRN, Bethany Tinoco DO  •  dextrose (GLUTOSE) oral gel 15 g, 15 g, Oral, Q15 Min PRN, Bethany Tinoco DO  •  enoxaparin (LOVENOX) syringe 40 mg, 40 mg, Subcutaneous, Q24H, Bethany Tinoco DO, 40 mg at 03/02/19 0836  •  glucagon (GLUCAGEN) injection 1 mg, 1 mg, Subcutaneous, PRN, Bethany Tinoco,   •  insulin aspart (novoLOG) injection 0-9 Units, 0-9 Units, Subcutaneous, Q6H, Tres Malone MD  •  lactated ringers infusion, 100 mL/hr, Intravenous, Continuous, Josse Garcia MD, Last Rate: 100 mL/hr at 03/02/19 1139, 100 mL/hr at 03/02/19 1139  •  levoFLOXacin (LEVAQUIN) 750 mg/150 mL D5W (premix) (LEVAQUIN) 750 mg, 750 mg, Intravenous, Q48H, Tres Malone MD, 750 mg at 03/01/19 1931  •  metroNIDAZOLE (FLAGYL) IVPB 500 mg, 500 mg, Intravenous, Q8H, Tres Malone MD, Stopped at 03/02/19 0951  •  nitroglycerin (NITROSTAT) SL tablet 0.4 mg, 0.4 mg, Sublingual, Q5 Min PRN, Bethany Tinoco DO  •  ondansetron (ZOFRAN) tablet 4 mg, 4 mg, Oral, Q6H PRN **OR** ondansetron ODT (ZOFRAN-ODT) disintegrating tablet 4 mg, 4 mg, Oral, Q6H PRN **OR** ondansetron (ZOFRAN) injection 4 mg, 4 mg, Intravenous, Q6H PRN, Bethany Tinoco DO, 4 mg at 03/01/19 1602  •  polyvinyl alcohol (LIQUIFILM) 1.4 % ophthalmic solution 1 drop, 1 drop, Both Eyes, BID, Bethany Tinoco DO, 1 drop at 03/02/19 0836  •  sodium chloride 0.9 % flush 3  mL, 3 mL, Intravenous, Q12H, Bethany Tinoco, DO, 3 mL at 03/02/19 0836  •  sodium chloride 0.9 % flush 3-10 mL, 3-10 mL, Intravenous, PRN, Bethany Tinoco, DO  •  sodium chloride 0.9 % infusion 40 mL, 40 mL, Intravenous, PRN, Bethany Tinoco, DO  •  traMADol (ULTRAM) tablet 50 mg, 50 mg, Oral, Q6H PRN, Bethany Tinoco, , 50 mg at 03/01/19 8854      Assessment/Plan     Abdominal pain acute and unknown etiology  -CT scan of the abdomen and pelvis done without contrast at [facility] was negative  -Patient is on Levaquin and Flagyl for suspected diverticulitis  -Patient continues to be nothing by mouth and on IV fluids and patient CBC has improved     OA     Asthma chronic persistent stable     Diabetes 2 stable     Body mass index is 30.12 kg/m²./ Obesity     GERD on PPI     Essential Hypertension controlled     Seizure disorder stable     Hypothyroidisim stable     H/O Breast Cancer and Mastectomy    DVT prophylaxis on Lovenox        Plan for disposition: Home    Bravo De La Cruz MD  03/02/19  1:13 PM

## 2019-03-03 LAB
ALBUMIN SERPL-MCNC: 3.2 G/DL (ref 3.5–5.2)
ALBUMIN/GLOB SERPL: 1.1 G/DL
ALP SERPL-CCNC: 44 U/L (ref 40–129)
ALT SERPL W P-5'-P-CCNC: 9 U/L (ref 5–33)
AMYLASE SERPL-CCNC: 12 U/L (ref 28–100)
ANION GAP SERPL CALCULATED.3IONS-SCNC: 11.7 MMOL/L
AST SERPL-CCNC: 12 U/L (ref 5–32)
BASOPHILS # BLD AUTO: 0.03 10*3/MM3 (ref 0–0.2)
BASOPHILS NFR BLD AUTO: 0.4 % (ref 0–1.5)
BILIRUB SERPL-MCNC: 0.6 MG/DL (ref 0.2–1.2)
BUN BLD-MCNC: 18 MG/DL (ref 8–23)
BUN/CREAT SERPL: 15 (ref 7–25)
CALCIUM SPEC-SCNC: 8.3 MG/DL (ref 8.8–10.5)
CHLORIDE SERPL-SCNC: 106 MMOL/L (ref 98–107)
CO2 SERPL-SCNC: 21.3 MMOL/L (ref 22–29)
CREAT BLD-MCNC: 1.2 MG/DL (ref 0.57–1)
CRP SERPL-MCNC: 5.6 MG/DL (ref 0–0.5)
DEPRECATED RDW RBC AUTO: 46.2 FL (ref 37–54)
EOSINOPHIL # BLD AUTO: 0.14 10*3/MM3 (ref 0–0.4)
EOSINOPHIL NFR BLD AUTO: 1.9 % (ref 0.3–6.2)
ERYTHROCYTE [DISTWIDTH] IN BLOOD BY AUTOMATED COUNT: 12.8 % (ref 12.3–15.4)
ERYTHROCYTE [SEDIMENTATION RATE] IN BLOOD: 37 MM/HR (ref 0–20)
GFR SERPL CREATININE-BSD FRML MDRD: 42 ML/MIN/1.73
GLOBULIN UR ELPH-MCNC: 2.9 GM/DL
GLUCOSE BLD-MCNC: 81 MG/DL (ref 65–99)
GLUCOSE BLDC GLUCOMTR-MCNC: 85 MG/DL (ref 70–130)
GLUCOSE BLDC GLUCOMTR-MCNC: 88 MG/DL (ref 70–130)
HCT VFR BLD AUTO: 35.1 % (ref 34–46.6)
HGB BLD-MCNC: 11.1 G/DL (ref 12–15.9)
IMM GRANULOCYTES # BLD AUTO: 0.03 10*3/MM3 (ref 0–0.05)
IMM GRANULOCYTES NFR BLD AUTO: 0.4 % (ref 0–0.5)
LIPASE SERPL-CCNC: 11 U/L (ref 13–60)
LYMPHOCYTES # BLD AUTO: 1.26 10*3/MM3 (ref 0.7–3.1)
LYMPHOCYTES NFR BLD AUTO: 16.9 % (ref 19.6–45.3)
MAGNESIUM SERPL-MCNC: 2 MG/DL (ref 1.7–2.5)
MCH RBC QN AUTO: 30.8 PG (ref 26.6–33)
MCHC RBC AUTO-ENTMCNC: 31.6 G/DL (ref 31.5–35.7)
MCV RBC AUTO: 97.5 FL (ref 79–97)
MONOCYTES # BLD AUTO: 0.6 10*3/MM3 (ref 0.1–0.9)
MONOCYTES NFR BLD AUTO: 8 % (ref 5–12)
NEUTROPHILS # BLD AUTO: 5.4 10*3/MM3 (ref 1.4–7)
NEUTROPHILS NFR BLD AUTO: 72.4 % (ref 42.7–76)
NRBC BLD AUTO-RTO: 0 /100 WBC (ref 0–0)
PHOSPHATE SERPL-MCNC: 2.8 MG/DL (ref 2.7–4.5)
PLATELET # BLD AUTO: 179 10*3/MM3 (ref 140–450)
PMV BLD AUTO: 10.1 FL (ref 6–12)
POTASSIUM BLD-SCNC: 4 MMOL/L (ref 3.5–5.2)
PROT SERPL-MCNC: 6.1 G/DL (ref 6–8.5)
RBC # BLD AUTO: 3.6 10*6/MM3 (ref 3.77–5.28)
SODIUM BLD-SCNC: 139 MMOL/L (ref 136–145)
VIT B12 BLD-MCNC: <150 PG/ML (ref 232–1245)
WBC NRBC COR # BLD: 7.46 10*3/MM3 (ref 3.4–10.8)

## 2019-03-03 PROCEDURE — 84100 ASSAY OF PHOSPHORUS: CPT | Performed by: INTERNAL MEDICINE

## 2019-03-03 PROCEDURE — 85025 COMPLETE CBC W/AUTO DIFF WBC: CPT | Performed by: SURGERY

## 2019-03-03 PROCEDURE — 25010000002 ONDANSETRON PER 1 MG: Performed by: HOSPITALIST

## 2019-03-03 PROCEDURE — 80053 COMPREHEN METABOLIC PANEL: CPT | Performed by: INTERNAL MEDICINE

## 2019-03-03 PROCEDURE — 82150 ASSAY OF AMYLASE: CPT | Performed by: INTERNAL MEDICINE

## 2019-03-03 PROCEDURE — 82962 GLUCOSE BLOOD TEST: CPT

## 2019-03-03 PROCEDURE — 99232 SBSQ HOSP IP/OBS MODERATE 35: CPT | Performed by: INTERNAL MEDICINE

## 2019-03-03 PROCEDURE — 83735 ASSAY OF MAGNESIUM: CPT | Performed by: INTERNAL MEDICINE

## 2019-03-03 PROCEDURE — 85652 RBC SED RATE AUTOMATED: CPT | Performed by: INTERNAL MEDICINE

## 2019-03-03 PROCEDURE — 25010000002 ENOXAPARIN PER 10 MG: Performed by: HOSPITALIST

## 2019-03-03 PROCEDURE — 82607 VITAMIN B-12: CPT | Performed by: INTERNAL MEDICINE

## 2019-03-03 PROCEDURE — 83690 ASSAY OF LIPASE: CPT | Performed by: INTERNAL MEDICINE

## 2019-03-03 PROCEDURE — 25010000002 CYANOCOBALAMIN PER 1000 MCG: Performed by: INTERNAL MEDICINE

## 2019-03-03 PROCEDURE — 86140 C-REACTIVE PROTEIN: CPT | Performed by: INTERNAL MEDICINE

## 2019-03-03 PROCEDURE — 94799 UNLISTED PULMONARY SVC/PX: CPT

## 2019-03-03 PROCEDURE — 25010000002 MEROPENEM PER 100 MG: Performed by: INTERNAL MEDICINE

## 2019-03-03 PROCEDURE — 99232 SBSQ HOSP IP/OBS MODERATE 35: CPT | Performed by: SURGERY

## 2019-03-03 RX ORDER — CYANOCOBALAMIN 1000 UG/ML
1000 INJECTION, SOLUTION INTRAMUSCULAR; SUBCUTANEOUS WEEKLY
Status: DISCONTINUED | OUTPATIENT
Start: 2019-03-03 | End: 2019-03-06 | Stop reason: HOSPADM

## 2019-03-03 RX ADMIN — METRONIDAZOLE 500 MG: 500 INJECTION, SOLUTION INTRAVENOUS at 23:52

## 2019-03-03 RX ADMIN — METRONIDAZOLE 500 MG: 500 INJECTION, SOLUTION INTRAVENOUS at 09:07

## 2019-03-03 RX ADMIN — TRAMADOL HYDROCHLORIDE 50 MG: 50 TABLET, FILM COATED ORAL at 14:04

## 2019-03-03 RX ADMIN — POLYVINYL ALCOHOL 1 DROP: 14 SOLUTION/ DROPS OPHTHALMIC at 20:14

## 2019-03-03 RX ADMIN — CYANOCOBALAMIN 1000 MCG: 1000 INJECTION, SOLUTION INTRAMUSCULAR; SUBCUTANEOUS at 11:24

## 2019-03-03 RX ADMIN — METRONIDAZOLE 500 MG: 500 INJECTION, SOLUTION INTRAVENOUS at 00:03

## 2019-03-03 RX ADMIN — BISACODYL 10 MG: 10 SUPPOSITORY RECTAL at 09:02

## 2019-03-03 RX ADMIN — BUDESONIDE AND FORMOTEROL FUMARATE DIHYDRATE 2 PUFF: 80; 4.5 AEROSOL RESPIRATORY (INHALATION) at 20:47

## 2019-03-03 RX ADMIN — SODIUM CHLORIDE, POTASSIUM CHLORIDE, SODIUM LACTATE AND CALCIUM CHLORIDE 100 ML/HR: 600; 310; 30; 20 INJECTION, SOLUTION INTRAVENOUS at 21:25

## 2019-03-03 RX ADMIN — MEROPENEM 1 G: 1 INJECTION, POWDER, FOR SOLUTION INTRAVENOUS at 11:20

## 2019-03-03 RX ADMIN — ENOXAPARIN SODIUM 40 MG: 100 INJECTION SUBCUTANEOUS at 09:02

## 2019-03-03 RX ADMIN — SODIUM CHLORIDE, POTASSIUM CHLORIDE, SODIUM LACTATE AND CALCIUM CHLORIDE 100 ML/HR: 600; 310; 30; 20 INJECTION, SOLUTION INTRAVENOUS at 09:06

## 2019-03-03 RX ADMIN — POLYVINYL ALCOHOL 1 DROP: 14 SOLUTION/ DROPS OPHTHALMIC at 09:02

## 2019-03-03 RX ADMIN — SODIUM CHLORIDE, PRESERVATIVE FREE 3 ML: 5 INJECTION INTRAVENOUS at 09:02

## 2019-03-03 RX ADMIN — METRONIDAZOLE 500 MG: 500 INJECTION, SOLUTION INTRAVENOUS at 15:15

## 2019-03-03 RX ADMIN — BUDESONIDE AND FORMOTEROL FUMARATE DIHYDRATE 2 PUFF: 80; 4.5 AEROSOL RESPIRATORY (INHALATION) at 08:34

## 2019-03-03 RX ADMIN — ONDANSETRON 4 MG: 2 INJECTION, SOLUTION INTRAMUSCULAR; INTRAVENOUS at 19:48

## 2019-03-03 RX ADMIN — MEROPENEM 500 MG: 500 INJECTION, POWDER, FOR SOLUTION INTRAVENOUS at 21:59

## 2019-03-03 NOTE — PROGRESS NOTES
"Hospitalist Team      Patient Care Team:  Derrick Lipscomb MD as PCP - General  Derrick Lipscomb MD as PCP - Family Medicine        Chief Complaint:  Abdominal pain and follow-up note      Subjective    This patient ca me to [facility] ER with sudden onset of abdominal pain at home and it gradually worsened as the day went on.  She had spent several hours in the emergency room at [facility] and they felt that they would get a room because their beds were full but she said they could not find her room so they called [facility] for transfer when they realized they could not get her a bed.  Her abdominal pain is mildly improved with the antibiotics that were given.  She is allergic to penicillin.  The CT scan of her abdomen is negative chest x-ray is negative urinalysis was negative but her white blood cell count is elevated.  Lactic acid level is elevated she was febrile and tachycardic.  Patient appears to have sepsis from an unknown organism and sepsis protocol was ordered with aztreonam and vancomycin.       Interval History and ROS:     Patient seen March 3, 2019 and she is feeling little better compared to yesterday.  She still has abdominal pain and no nausea and she is feeling hungry.  She has moved her bowels 3 times in small amounts.  I also spoke to radiologist Dr. Khan and we discussed the CT findings which were not reported yesterday.  According to him CT looks abnormal and has intraluminal gas in the small bowel and possible small perforation but no intraperitoneal air.  Patient also has several small diverticuli in the small bowel.       Objective    Vital Signs  Temp:  [97.6 °F (36.4 °C)-99 °F (37.2 °C)] 97.6 °F (36.4 °C)  Heart Rate:  [57-87] 76  Resp:  [16-20] 16  BP: (132-148)/(62-78) 135/74  Oxygen Therapy  SpO2: 95 %  Pulse Oximetry Type: Intermittent  Device (Oxygen Therapy): room air}  Flowsheet Rows      First Filed Value   Admission Height  165.1 cm (65\") Documented at " 02/28/2019 2222   Admission Weight  82.1 kg (181 lb) Documented at 02/28/2019 2222            Physical Exam:    General Appearance:    Alert, cooperative, in no acute distress   Head:    Normocephalic, without obvious abnormality, atraumatic   Eyes:            Lids and lashes normal, conjunctivae and sclerae normal, no   icterus, no pallor, corneas clear, PERRLA   Ears:    Ears appear intact with no abnormalities noted   Throat:   No oral lesions, no thrush, oral mucosa moist   Neck:   No adenopathy, supple, trachea midline, no thyromegaly, no     carotid bruit, no JVD   Back:     No kyphosis present, no scoliosis present, no skin lesions,       erythema or scars, no tenderness to percussion or                   palpation,   range of motion normal   Lungs:     Clear to auscultation,respirations regular, even and                   unlabored    Heart:    Regular rhythm and normal rate, normal S1 and S2, no            murmur, no gallop, no rub, no click   Breast Exam:    Deferred   Abdomen:     Abdomen is soft but she has  tenderness across the entire abdomen and she has no rebound tenderness positive .  Bowel sounds are positive.     Genitalia:    Deferred   Extremities:   Moves all extremities well, no edema, no cyanosis, no              redness   Pulses:   Pulses palpable and equal bilaterally   Skin:   No bleeding, bruising or rash   Lymph nodes:   No palpable adenopathy   Neurologic:   Cranial nerves 2 - 12 grossly intact, sensation intact, DTR        present and equal bilaterally       Results Review:     I reviewed the patient's new clinical results.    Lab Results (last 24 hours)     Procedure Component Value Units Date/Time    Sedimentation Rate [[id number]]  (Abnormal) Collected:  03/03/19 0527    Specimen:  Blood Updated:  03/03/19 0736     Sed Rate 37 mm/hr     Vitamin B12 [[id number]]  (Abnormal) Collected:  03/03/19 0443    Specimen:  Blood Updated:  03/03/19 0653     Vitamin B-12 <150 pg/mL     POC Glucose  Once [[id number]]  (Normal) Collected:  03/03/19 0632    Specimen:  Blood Updated:  03/03/19 0638     Glucose 85 mg/dL     C-reactive Protein [[id number]]  (Abnormal) Collected:  03/03/19 0443    Specimen:  Blood Updated:  03/03/19 0602     C-Reactive Protein 5.60 mg/dL     Comprehensive Metabolic Panel [[id number]]  (Abnormal) Collected:  03/03/19 0443    Specimen:  Blood Updated:  03/03/19 0558     Glucose 81 mg/dL      BUN 18 mg/dL      Creatinine 1.20 mg/dL      Sodium 139 mmol/L      Potassium 4.0 mmol/L      Chloride 106 mmol/L      CO2 21.3 mmol/L      Calcium 8.3 mg/dL      Total Protein 6.1 g/dL      Albumin 3.20 g/dL      ALT (SGPT) 9 U/L      AST (SGOT) 12 U/L      Alkaline Phosphatase 44 U/L      Total Bilirubin 0.6 mg/dL      eGFR Non African Amer 42 mL/min/1.73      Globulin 2.9 gm/dL      A/G Ratio 1.1 g/dL      BUN/Creatinine Ratio 15.0     Anion Gap 11.7 mmol/L     Narrative:       The MDRD GFR formula is only valid for adults with stable renal function between ages 18 and 70.    Lipase [[id number]]  (Abnormal) Collected:  03/03/19 0443    Specimen:  Blood Updated:  03/03/19 0558     Lipase 11 U/L     Amylase [[id number]]  (Abnormal) Collected:  03/03/19 0443    Specimen:  Blood Updated:  03/03/19 0558     Amylase 12 U/L     Phosphorus [[id number]]  (Normal) Collected:  03/03/19 0443    Specimen:  Blood Updated:  03/03/19 0558     Phosphorus 2.8 mg/dL     Magnesium [[id number]]  (Normal) Collected:  03/03/19 0443    Specimen:  Blood Updated:  03/03/19 0558     Magnesium 2.0 mg/dL     CBC & Differential [[id number]] Collected:  03/03/19 0527    Specimen:  Blood Updated:  03/03/19 0537    Narrative:       The following orders were created for panel order CBC & Differential.  Procedure                               Abnormality         Status                     ---------                               -----------         ------                     CBC Auto Differential[[id number]]        Abnormal             Final result                 Please view results for these tests on the individual orders.    CBC Auto Differential [[id number]]  (Abnormal) Collected:  03/03/19 0527    Specimen:  Blood Updated:  03/03/19 0537     WBC 7.46 10*3/mm3      RBC 3.60 10*6/mm3      Hemoglobin 11.1 g/dL      Hematocrit 35.1 %      MCV 97.5 fL      MCH 30.8 pg      MCHC 31.6 g/dL      RDW 12.8 %      RDW-SD 46.2 fl      MPV 10.1 fL      Platelets 179 10*3/mm3      Neutrophil % 72.4 %      Lymphocyte % 16.9 %      Monocyte % 8.0 %      Eosinophil % 1.9 %      Basophil % 0.4 %      Immature Grans % 0.4 %      Neutrophils, Absolute 5.40 10*3/mm3      Lymphocytes, Absolute 1.26 10*3/mm3      Monocytes, Absolute 0.60 10*3/mm3      Eosinophils, Absolute 0.14 10*3/mm3      Basophils, Absolute 0.03 10*3/mm3      Immature Grans, Absolute 0.03 10*3/mm3      nRBC 0.0 /100 WBC     Blood Culture - Blood, Hand, Right [[id number]] Collected:  03/01/19 0235    Specimen:  Blood from Hand, Right Updated:  03/03/19 0245     Blood Culture No growth at 2 days    Blood Culture - Blood, Hand, Right [[id number]] Collected:  03/01/19 0210    Specimen:  Blood from Hand, Right Updated:  03/03/19 0230     Blood Culture No growth at 2 days    POC Glucose Once [[id number]]  (Normal) Collected:  03/02/19 2337    Specimen:  Blood Updated:  03/02/19 2342     Glucose 93 mg/dL     POC Glucose Once [[id number]]  (Normal) Collected:  03/02/19 1803    Specimen:  Blood Updated:  03/02/19 1812     Glucose 92 mg/dL     Urinalysis With Culture If Indicated - Urine, Clean Catch [[id number]]  (Normal) Collected:  03/02/19 1755    Specimen:  Urine, Clean Catch Updated:  03/02/19 1758     Color, UA Yellow     Appearance, UA Clear     pH, UA <=5.0     Specific Gravity, UA <=1.005     Glucose, UA Negative     Ketones, UA Negative     Bilirubin, UA Negative     Blood, UA Negative     Protein, UA Negative     Leuk Esterase, UA Negative     Nitrite, UA Negative     Urobilinogen, UA 0.2 E.U./dL     Narrative:       Urine microscopic not indicated.    POC Glucose Once [[id number]]  (Normal) Collected:  03/02/19 1200    Specimen:  Blood Updated:  03/02/19 1206     Glucose 91 mg/dL           Imaging Results (last 24 hours)     Procedure Component Value Units Date/Time    CT Abdomen Pelvis With Contrast [[id number]] Updated:  03/02/19 1759          Xray reviewed personally by physician.      ECG reviewed personally by physician  ECG/EMG Results (most recent)     Procedure Component Value Units Date/Time    ECG 12 Lead [[id number]] Collected:  03/01/19 0704     Updated:  03/01/19 0753    Narrative:       HEART RATE= 67  bpm  RR Interval= 892  ms  CA Interval= 200  ms  P Horizontal Axis= 12  deg  P Front Axis= -4  deg  QRSD Interval= 89  ms  QT Interval= 434  ms  QRS Axis= 5  deg  T Wave Axis= -4  deg  - OTHERWISE NORMAL ECG -  Sinus rhythm  Atrial premature complex  Low voltage, precordial leads  No change from prior tracing  Electronically Signed By: Dilia Peter ([facility]) 01-Mar-2019 07:53:38  Date and Time of Study: 2019-03-01 07:04:38          Medication Review:   I have reviewed the patient's current medication list    Current Facility-Administered Medications:   •  albuterol (PROVENTIL) nebulizer solution 0.083% 2.5 mg/3mL, 2.5 mg, Nebulization, Q4H PRN, Bethany Tinoco DO  •  bisacodyl (DULCOLAX) suppository 10 mg, 10 mg, Rectal, Daily, Tres Malone MD, 10 mg at 03/03/19 0902  •  budesonide-formoterol (SYMBICORT) 80-4.5 MCG/ACT inhaler 2 puff, 2 puff, Inhalation, BID - RT, Bethany Tinoco DO, 2 puff at 03/03/19 0834  •  clobetasol (TEMOVATE) 0.05 % cream 1 application, 1 application, Topical, BID PRN, Bethany Tinoco DO, 1 application at 03/01/19 0821  •  dextrose (D50W) 25 g/ 50mL Intravenous Solution 25 g, 25 g, Intravenous, Q15 Min PRN, Bethany Tinoco DO  •  dextrose (GLUTOSE) oral gel 15 g, 15 g, Oral, Q15 Min PRN, Bethany Tinoco, DO  •  enoxaparin (LOVENOX) syringe 40 mg, 40  mg, Subcutaneous, Q24H, Bethany Tinoco DO, 40 mg at 03/03/19 0902  •  glucagon (GLUCAGEN) injection 1 mg, 1 mg, Subcutaneous, PRN, Bethany Tinoco DO  •  insulin aspart (novoLOG) injection 0-9 Units, 0-9 Units, Subcutaneous, Q6H, Tres Malone MD  •  lactated ringers infusion, 100 mL/hr, Intravenous, Continuous, Josse Garcia MD, Last Rate: 100 mL/hr at 03/03/19 0906, 100 mL/hr at 03/03/19 0906  •  levoFLOXacin (LEVAQUIN) 750 mg/150 mL D5W (premix) (LEVAQUIN) 750 mg, 750 mg, Intravenous, Q48H, Tres Malone MD, 750 mg at 03/01/19 1931  •  metroNIDAZOLE (FLAGYL) IVPB 500 mg, 500 mg, Intravenous, Q8H, Tres Malone MD, Last Rate: 0 mL/hr at 03/02/19 1702, 500 mg at 03/03/19 0907  •  nitroglycerin (NITROSTAT) SL tablet 0.4 mg, 0.4 mg, Sublingual, Q5 Min PRN, Bethany Tinoco DO  •  ondansetron (ZOFRAN) tablet 4 mg, 4 mg, Oral, Q6H PRN **OR** ondansetron ODT (ZOFRAN-ODT) disintegrating tablet 4 mg, 4 mg, Oral, Q6H PRN **OR** ondansetron (ZOFRAN) injection 4 mg, 4 mg, Intravenous, Q6H PRN, Bethany Tinoco DO, 4 mg at 03/01/19 1602  •  polyvinyl alcohol (LIQUIFILM) 1.4 % ophthalmic solution 1 drop, 1 drop, Both Eyes, BID, Bethany Tinoco DO, 1 drop at 03/03/19 0902  •  sodium chloride 0.9 % flush 3 mL, 3 mL, Intravenous, Q12H, Bethany Tinoco DO, 3 mL at 03/03/19 0902  •  sodium chloride 0.9 % flush 3-10 mL, 3-10 mL, Intravenous, PRN, Bethany Tinoco DO  •  sodium chloride 0.9 % infusion 40 mL, 40 mL, Intravenous, PRN, Bethany Tinoco DO  •  traMADol (ULTRAM) tablet 50 mg, 50 mg, Oral, Q6H PRN, Bethany Tinoco DO, 50 mg at 03/02/19 2250      Assessment/Plan     Abdominal pain acute and unknown etiology but suspected diverticulitis with possible intraluminal air in the small bowel  -CT scan of the abdomen and pelvis done without contrast at [facility] was negative  -CT scan of the abdomen and pelvis with contrast done at [facility] is abnormal  -Patient is  on Levaquin and Flagyl for suspected diverticulitis and I think we may have to change Levaquin to meropenem as patient need better coverage for gram negatives as well as anaerobes and patient has allergies to penicillin.  -Patient continues to be nothing by mouth and on IV fluids  -CBC and CMP labs reviewed and also sed rate and CRP are fairly within reasonable range    Chronic kidney disease stage III on monitor BUN and creatinine avoid nephrotoxic medications     OA: Patient is n.p.o. and not on any p.o. medications at this time    Anemia with hemoglobin of 11.1 and macrocytic and will monitor hemoglobin as well as a start vitamin B12 injections     Asthma chronic persistent stable     Diabetes 2 stable well controlled     Body mass index is 30.12 kg/m²./ Obesity     GERD on PPI     Essential Hypertension controlled     Seizure disorder stable     Hypothyroidisim stable    Vitamin B12 deficiency and we will start patient on vitamin B12 injections     H/O Breast Cancer and Mastectomy    DVT prophylaxis on Lovenox        Plan for disposition: Home    Bravo De La Cruz MD  03/03/19  9:45 AM

## 2019-03-03 NOTE — PROGRESS NOTES
I am following her up for her abdominal pain.  I spoke with Dr. De La Cruz this morning.  She had a repeat CAT scan yesterday.  She says that she is feeling somewhat better.  She denies any nausea or vomiting.  She is afebrile vital signs are stable.  Her white blood count is normal at 7.46.  Her hemoglobin is 11.1.  CT scan of the abdomen from yesterday was reviewed by me.  I feel she likely has a small bowel diverticulitis.  She does have small bowel inflammation. I feel the areas of air are probably diverticula in the small bowel.  Her abdominal exam is less tender.  Her lungs are clear and equal.  He does have a history of lichen sclerosus and she is concerned regarding that I will speak to the hospitalist about that.  Continue n.p.o. and continue intravenous antibiotics recheck labs in the a.m.

## 2019-03-04 ENCOUNTER — APPOINTMENT (OUTPATIENT)
Dept: ULTRASOUND IMAGING | Facility: HOSPITAL | Age: 84
End: 2019-03-04

## 2019-03-04 LAB
ANION GAP SERPL CALCULATED.3IONS-SCNC: 12.2 MMOL/L
BASOPHILS # BLD AUTO: 0.02 10*3/MM3 (ref 0–0.2)
BASOPHILS NFR BLD AUTO: 0.4 % (ref 0–1.5)
BUN BLD-MCNC: 18 MG/DL (ref 8–23)
BUN/CREAT SERPL: 17 (ref 7–25)
CALCIUM SPEC-SCNC: 7.8 MG/DL (ref 8.8–10.5)
CHLORIDE SERPL-SCNC: 106 MMOL/L (ref 98–107)
CO2 SERPL-SCNC: 22.8 MMOL/L (ref 22–29)
CREAT BLD-MCNC: 1.06 MG/DL (ref 0.57–1)
DEPRECATED RDW RBC AUTO: 42.9 FL (ref 37–54)
EOSINOPHIL # BLD AUTO: 0.15 10*3/MM3 (ref 0–0.4)
EOSINOPHIL NFR BLD AUTO: 2.7 % (ref 0.3–6.2)
ERYTHROCYTE [DISTWIDTH] IN BLOOD BY AUTOMATED COUNT: 12.4 % (ref 12.3–15.4)
GFR SERPL CREATININE-BSD FRML MDRD: 49 ML/MIN/1.73
GLUCOSE BLD-MCNC: 74 MG/DL (ref 65–99)
GLUCOSE BLDC GLUCOMTR-MCNC: 103 MG/DL (ref 70–130)
GLUCOSE BLDC GLUCOMTR-MCNC: 81 MG/DL (ref 70–130)
GLUCOSE BLDC GLUCOMTR-MCNC: 82 MG/DL (ref 70–130)
GLUCOSE BLDC GLUCOMTR-MCNC: 83 MG/DL (ref 70–130)
GLUCOSE BLDC GLUCOMTR-MCNC: 83 MG/DL (ref 70–130)
HCT VFR BLD AUTO: 31.2 % (ref 34–46.6)
HGB BLD-MCNC: 10.1 G/DL (ref 12–15.9)
IMM GRANULOCYTES # BLD AUTO: 0.02 10*3/MM3 (ref 0–0.05)
IMM GRANULOCYTES NFR BLD AUTO: 0.4 % (ref 0–0.5)
LYMPHOCYTES # BLD AUTO: 0.99 10*3/MM3 (ref 0.7–3.1)
LYMPHOCYTES NFR BLD AUTO: 17.7 % (ref 19.6–45.3)
MCH RBC QN AUTO: 30.5 PG (ref 26.6–33)
MCHC RBC AUTO-ENTMCNC: 32.4 G/DL (ref 31.5–35.7)
MCV RBC AUTO: 94.3 FL (ref 79–97)
MONOCYTES # BLD AUTO: 0.55 10*3/MM3 (ref 0.1–0.9)
MONOCYTES NFR BLD AUTO: 9.9 % (ref 5–12)
NEUTROPHILS # BLD AUTO: 3.85 10*3/MM3 (ref 1.4–7)
NEUTROPHILS NFR BLD AUTO: 68.9 % (ref 42.7–76)
NRBC BLD AUTO-RTO: 0 /100 WBC (ref 0–0)
PLATELET # BLD AUTO: 204 10*3/MM3 (ref 140–450)
PMV BLD AUTO: 9.9 FL (ref 6–12)
POTASSIUM BLD-SCNC: 3.9 MMOL/L (ref 3.5–5.2)
RBC # BLD AUTO: 3.31 10*6/MM3 (ref 3.77–5.28)
SODIUM BLD-SCNC: 141 MMOL/L (ref 136–145)
WBC NRBC COR # BLD: 5.58 10*3/MM3 (ref 3.4–10.8)

## 2019-03-04 PROCEDURE — 25010000002 ENOXAPARIN PER 10 MG: Performed by: HOSPITALIST

## 2019-03-04 PROCEDURE — 80048 BASIC METABOLIC PNL TOTAL CA: CPT | Performed by: SURGERY

## 2019-03-04 PROCEDURE — 93970 EXTREMITY STUDY: CPT

## 2019-03-04 PROCEDURE — 25010000002 MEROPENEM PER 100 MG: Performed by: INTERNAL MEDICINE

## 2019-03-04 PROCEDURE — 99221 1ST HOSP IP/OBS SF/LOW 40: CPT | Performed by: INTERNAL MEDICINE

## 2019-03-04 PROCEDURE — 82962 GLUCOSE BLOOD TEST: CPT

## 2019-03-04 PROCEDURE — 25010000002 ONDANSETRON PER 1 MG: Performed by: HOSPITALIST

## 2019-03-04 PROCEDURE — 85025 COMPLETE CBC W/AUTO DIFF WBC: CPT | Performed by: SURGERY

## 2019-03-04 PROCEDURE — 99232 SBSQ HOSP IP/OBS MODERATE 35: CPT | Performed by: NURSE PRACTITIONER

## 2019-03-04 PROCEDURE — 99232 SBSQ HOSP IP/OBS MODERATE 35: CPT | Performed by: SURGERY

## 2019-03-04 RX ORDER — MONTELUKAST SODIUM 10 MG/1
10 TABLET ORAL NIGHTLY
Status: DISCONTINUED | OUTPATIENT
Start: 2019-03-04 | End: 2019-03-06 | Stop reason: HOSPADM

## 2019-03-04 RX ORDER — L.ACID,PARA/B.BIFIDUM/S.THERM 8B CELL
1 CAPSULE ORAL DAILY
Status: DISCONTINUED | OUTPATIENT
Start: 2019-03-04 | End: 2019-03-06 | Stop reason: HOSPADM

## 2019-03-04 RX ORDER — LEVOTHYROXINE SODIUM 0.05 MG/1
50 TABLET ORAL
Status: DISCONTINUED | OUTPATIENT
Start: 2019-03-05 | End: 2019-03-06 | Stop reason: HOSPADM

## 2019-03-04 RX ORDER — FAMOTIDINE 20 MG/1
20 TABLET, FILM COATED ORAL DAILY
Status: DISCONTINUED | OUTPATIENT
Start: 2019-03-04 | End: 2019-03-06 | Stop reason: HOSPADM

## 2019-03-04 RX ADMIN — FAMOTIDINE 20 MG: 20 TABLET, FILM COATED ORAL at 16:20

## 2019-03-04 RX ADMIN — METRONIDAZOLE 500 MG: 500 INJECTION, SOLUTION INTRAVENOUS at 23:40

## 2019-03-04 RX ADMIN — Medication 1 CAPSULE: at 16:20

## 2019-03-04 RX ADMIN — TRAMADOL HYDROCHLORIDE 50 MG: 50 TABLET, FILM COATED ORAL at 22:45

## 2019-03-04 RX ADMIN — SODIUM CHLORIDE, PRESERVATIVE FREE 3 ML: 5 INJECTION INTRAVENOUS at 21:04

## 2019-03-04 RX ADMIN — METRONIDAZOLE 500 MG: 500 INJECTION, SOLUTION INTRAVENOUS at 16:19

## 2019-03-04 RX ADMIN — MEROPENEM 500 MG: 500 INJECTION, POWDER, FOR SOLUTION INTRAVENOUS at 21:03

## 2019-03-04 RX ADMIN — METRONIDAZOLE 500 MG: 500 INJECTION, SOLUTION INTRAVENOUS at 09:10

## 2019-03-04 RX ADMIN — ENOXAPARIN SODIUM 40 MG: 100 INJECTION SUBCUTANEOUS at 09:11

## 2019-03-04 RX ADMIN — MEROPENEM 500 MG: 500 INJECTION, POWDER, FOR SOLUTION INTRAVENOUS at 11:08

## 2019-03-04 RX ADMIN — POLYVINYL ALCOHOL 1 DROP: 14 SOLUTION/ DROPS OPHTHALMIC at 20:44

## 2019-03-04 RX ADMIN — TRAMADOL HYDROCHLORIDE 50 MG: 50 TABLET, FILM COATED ORAL at 14:33

## 2019-03-04 RX ADMIN — POLYVINYL ALCOHOL 1 DROP: 14 SOLUTION/ DROPS OPHTHALMIC at 09:12

## 2019-03-04 RX ADMIN — ONDANSETRON 4 MG: 2 INJECTION, SOLUTION INTRAMUSCULAR; INTRAVENOUS at 11:07

## 2019-03-04 RX ADMIN — SODIUM CHLORIDE, POTASSIUM CHLORIDE, SODIUM LACTATE AND CALCIUM CHLORIDE 100 ML/HR: 600; 310; 30; 20 INJECTION, SOLUTION INTRAVENOUS at 08:51

## 2019-03-04 RX ADMIN — MONTELUKAST SODIUM 10 MG: 10 TABLET, FILM COATED ORAL at 20:45

## 2019-03-04 NOTE — PROGRESS NOTES
She has some nausea.  She says her pain is much improved.  She is afebrile vital signs are stable.  Her white blood count is now 5.  Her abdomen is soft and nontender.  She has normal active bowel sounds.  We will try her on some clear liquids continue intravenous antibiotics

## 2019-03-04 NOTE — CONSULTS
Patient Care Team:  Derrick Lipscomb MD as PCP - General  Derrick Lipscomb MD as PCP - Family Medicine    CHIEF COMPLAINT: Diverticulitis    HISTORY OF PRESENT ILLNESS:    87 yo with known colonic diverticulosis, presented with abd pain and CT shows Small bowel diverticulitis-question of small perf is prob just the SB Tics them selves.  She is up in a chair and comfortable after 3 clear trays today and feel better is talking about getting home to care for her Blind .   She is opposed to any further colonoscopy and at this time I see no indication.  She does complain of mild burning mouth but is on Probiotics so may need a course of Fluconazole will check ontomorrow.      Past Medical History:   Diagnosis Date   • Arthritis    • Asthma    • Breathing problem    • Cancer (CMS/HCC)    • Constipation    • Diabetes (CMS/HCC)    • Fatigue    • GERD (gastroesophageal reflux disease)    • Headache    • History of allergy    • Hypertension    • Irregular bowel habits    • Lichen sclerosus    • Pregnancy, ectopic    • Seizures (CMS/HCC)    • Shortness of breath    • Thyroid trouble      Past Surgical History:   Procedure Laterality Date   • BREAST LUMPECTOMY     • CARDIAC CATHETERIZATION     • COLONOSCOPY     • HEMORRHOIDECTOMY     • HIP SURGERY  2011   • HYSTERECTOMY     • LYMPH NODE BIOPSY      excisional    • MASTECTOMY, PARTIAL Left    • OTHER SURGICAL HISTORY      Closed treatment acromioclavicular dislocation, manipulation   • TUBAL ABDOMINAL LIGATION      when she was 48     Family History   Problem Relation Age of Onset   • Emphysema Father    • Diabetes Other    • Hypertension Other    • Thyroid disease Other      Social History     Tobacco Use   • Smoking status: Never Smoker   Substance Use Topics   • Alcohol use: No   • Drug use: Not on file     Medications Prior to Admission   Medication Sig Dispense Refill Last Dose   • albuterol (PROVENTIL HFA;VENTOLIN HFA) 108 (90 BASE) MCG/ACT inhaler 2 puffs  Every 4 (Four) Hours As Needed. Albuterol Sulfate  MCG/ACT AERS; Patient Sig: Albuterol Sulfate  MCG/ACT AERS ; 0; 04-Mar-2015; Active   Taking   • budesonide-formoterol (SYMBICORT) 80-4.5 MCG/ACT inhaler 2 puffs As Needed. Symbicort 80-4.5 MCG/ACT Inhalation Aerosol; Patient Sig: Symbicort 80-4.5 MCG/ACT Inhalation Aerosol ; 0; 23-Oct-2014; Active   Taking   • clobetasol (TEMOVATE) 0.05 % cream Apply 1 application topically to the appropriate area as directed As Needed.   Taking   • CycloSPORINE (RESTASIS OP) Apply 2 drops to eye(s) as directed by provider As Needed.   Taking   • hydrochlorothiazide (HYDRODIURIL) 12.5 MG tablet Take 12.5 mg by mouth Daily.      • ibuprofen (ADVIL,MOTRIN) 200 MG tablet Take 200 mg by mouth Every 6 (Six) Hours As Needed for Mild Pain .      • levothyroxine (SYNTHROID, LEVOTHROID) 50 MCG tablet Take 50 mcg by mouth Daily.   Taking   • loratadine (CLARITIN) 10 MG tablet Take 10 mg by mouth Daily.      • losartan (COZAAR) 100 MG tablet Take 50 mg by mouth Daily.   Taking   • ranitidine (ZANTAC) 150 MG tablet Take 150 mg by mouth 2 (Two) Times a Day.   Taking   • sitaGLIPtin (JANUVIA) 100 MG tablet Take 100 mg by mouth Daily.   Taking   • tacrolimus (PROTOPIC) 0.1 % ointment Apply  topically to the appropriate area as directed As Needed.   Taking   • tamoxifen (NOLVADEX) 20 MG chemo tablet       • acetaminophen (TYLENOL) 325 MG tablet Take  by mouth.   Taking   • Aspirin (ASPIR-81 PO) Take  by mouth.   Taking   • montelukast (SINGULAIR) 10 MG tablet Take  by mouth.   Taking   • nabumetone (RELAFEN) 750 MG tablet Take  by mouth.   Not Taking     Allergies:  Penicillins and Phenobarbital    REVIEW OF SYSTEMS:  Please see the above history of present illness for pertinent positives and negatives.  The remainder of the patient's systems have been reviewed and are negative.     Vital Signs  Temp:  [97.6 °F (36.4 °C)-98.6 °F (37 °C)] 98.6 °F (37 °C)  Heart Rate:  [65-73]  "65  Resp:  [16-18] 16  BP: (122-170)/(57-76) 144/70    Flowsheet Rows      First Filed Value   Admission Height  165.1 cm (65\") Documented at 02/28/2019 2222   Admission Weight  82.1 kg (181 lb) Documented at 02/28/2019 2222           Physical Exam:  Physical Exam   Constitutional: Patient appears well-developed and well-nourished and in no acute distress   HEENT:   Head: Normocephalic and atraumatic.   Eyes:  Pupils are equal, round, and reactive to light. EOM are intact. Sclerae are anicteric and non-injected.  Mouth and Throat: Patient has moist mucous membranes. Oropharynx is clear of any erythema or exudate.     Neck: Neck supple. No JVD present. No thyromegaly present. No lymphadenopathy present.  Cardiovascular: Regular rate, regular rhythm, S1 normal and S2 normal.  Exam reveals no gallop and no friction rub.  No murmur heard.  Pulmonary/Chest: Lungs are clear to auscultation bilaterally. No respiratory distress. No wheezes. No rhonchi. No rales.   Abdominal: Soft. Bowel sounds are normal. No distension and no mass. There is no hepatosplenomegaly. There is no tenderness.   Musculoskeletal: Normal Muscle tone  Extremities: No edema. Pulses are palpable in all 4 extremities.  Neurological: Patient is alert and oriented to person, place, and time. Cranial nerves II-XII are grossly intact with no focal deficits.  Skin: Skin is warm. No rash noted. Nails show no clubbing.  No cyanosis or erythema.    Debilities/Disabilities Identified: None  Emotional Behavior: Appropriate     Results Review:    I reviewed the patient's new clinical results.  Lab Results (most recent)     Procedure Component Value Units Date/Time    POC Glucose Once [[id number]]  (Normal) Collected:  03/04/19 1635    Specimen:  Blood Updated:  03/04/19 1642     Glucose 83 mg/dL     POC Glucose Once [[id number]]  (Normal) Collected:  03/04/19 1238    Specimen:  Blood Updated:  03/04/19 1246     Glucose 82 mg/dL     Basic Metabolic Panel [[id number]]  " (Abnormal) Collected:  03/04/19 0406    Specimen:  Blood Updated:  03/04/19 0532     Glucose 74 mg/dL      BUN 18 mg/dL      Creatinine 1.06 mg/dL      Sodium 141 mmol/L      Potassium 3.9 mmol/L      Chloride 106 mmol/L      CO2 22.8 mmol/L      Calcium 7.8 mg/dL      eGFR Non African Amer 49 mL/min/1.73      BUN/Creatinine Ratio 17.0     Anion Gap 12.2 mmol/L     Narrative:       The MDRD GFR formula is only valid for adults with stable renal function between ages 18 and 70.    CBC & Differential [[id number]] Collected:  03/04/19 0406    Specimen:  Blood Updated:  03/04/19 0507    Narrative:       The following orders were created for panel order CBC & Differential.  Procedure                               Abnormality         Status                     ---------                               -----------         ------                     CBC Auto Differential[[id number]]        Abnormal            Final result                 Please view results for these tests on the individual orders.    CBC Auto Differential [[id number]]  (Abnormal) Collected:  03/04/19 0406    Specimen:  Blood Updated:  03/04/19 0507     WBC 5.58 10*3/mm3      RBC 3.31 10*6/mm3      Hemoglobin 10.1 g/dL      Hematocrit 31.2 %      MCV 94.3 fL      MCH 30.5 pg      MCHC 32.4 g/dL      RDW 12.4 %      RDW-SD 42.9 fl      MPV 9.9 fL      Platelets 204 10*3/mm3      Neutrophil % 68.9 %      Lymphocyte % 17.7 %      Monocyte % 9.9 %      Eosinophil % 2.7 %      Basophil % 0.4 %      Immature Grans % 0.4 %      Neutrophils, Absolute 3.85 10*3/mm3      Lymphocytes, Absolute 0.99 10*3/mm3      Monocytes, Absolute 0.55 10*3/mm3      Eosinophils, Absolute 0.15 10*3/mm3      Basophils, Absolute 0.02 10*3/mm3      Immature Grans, Absolute 0.02 10*3/mm3      nRBC 0.0 /100 WBC     Blood Culture - Blood, Hand, Right [[id number]] Collected:  03/01/19 0235    Specimen:  Blood from Hand, Right Updated:  03/04/19 0245     Blood Culture No growth at 3 days    Blood  Culture - Blood, Hand, Right [[id number]] Collected:  03/01/19 0210    Specimen:  Blood from Hand, Right Updated:  03/04/19 0230     Blood Culture No growth at 3 days    Sedimentation Rate [[id number]]  (Abnormal) Collected:  03/03/19 0527    Specimen:  Blood Updated:  03/03/19 0736     Sed Rate 37 mm/hr     Vitamin B12 [[id number]]  (Abnormal) Collected:  03/03/19 0443    Specimen:  Blood Updated:  03/03/19 0653     Vitamin B-12 <150 pg/mL     C-reactive Protein [[id number]]  (Abnormal) Collected:  03/03/19 0443    Specimen:  Blood Updated:  03/03/19 0602     C-Reactive Protein 5.60 mg/dL     Comprehensive Metabolic Panel [[id number]]  (Abnormal) Collected:  03/03/19 0443    Specimen:  Blood Updated:  03/03/19 0558     Glucose 81 mg/dL      BUN 18 mg/dL      Creatinine 1.20 mg/dL      Sodium 139 mmol/L      Potassium 4.0 mmol/L      Chloride 106 mmol/L      CO2 21.3 mmol/L      Calcium 8.3 mg/dL      Total Protein 6.1 g/dL      Albumin 3.20 g/dL      ALT (SGPT) 9 U/L      AST (SGOT) 12 U/L      Alkaline Phosphatase 44 U/L      Total Bilirubin 0.6 mg/dL      eGFR Non African Amer 42 mL/min/1.73      Globulin 2.9 gm/dL      A/G Ratio 1.1 g/dL      BUN/Creatinine Ratio 15.0     Anion Gap 11.7 mmol/L     Narrative:       The MDRD GFR formula is only valid for adults with stable renal function between ages 18 and 70.    Lipase [[id number]]  (Abnormal) Collected:  03/03/19 0443    Specimen:  Blood Updated:  03/03/19 0558     Lipase 11 U/L     Amylase [[id number]]  (Abnormal) Collected:  03/03/19 0443    Specimen:  Blood Updated:  03/03/19 0558     Amylase 12 U/L     Phosphorus [[id number]]  (Normal) Collected:  03/03/19 0443    Specimen:  Blood Updated:  03/03/19 0558     Phosphorus 2.8 mg/dL     Magnesium [[id number]]  (Normal) Collected:  03/03/19 0443    Specimen:  Blood Updated:  03/03/19 0558     Magnesium 2.0 mg/dL     CBC & Differential [[id number]] Collected:  03/03/19 0527    Specimen:  Blood Updated:  03/03/19 0537     Narrative:       The following orders were created for panel order CBC & Differential.  Procedure                               Abnormality         Status                     ---------                               -----------         ------                     CBC Auto Differential[[id number]]        Abnormal            Final result                 Please view results for these tests on the individual orders.    CBC Auto Differential [[id number]]  (Abnormal) Collected:  03/03/19 0527    Specimen:  Blood Updated:  03/03/19 0537     WBC 7.46 10*3/mm3      RBC 3.60 10*6/mm3      Hemoglobin 11.1 g/dL      Hematocrit 35.1 %      MCV 97.5 fL      MCH 30.8 pg      MCHC 31.6 g/dL      RDW 12.8 %      RDW-SD 46.2 fl      MPV 10.1 fL      Platelets 179 10*3/mm3      Neutrophil % 72.4 %      Lymphocyte % 16.9 %      Monocyte % 8.0 %      Eosinophil % 1.9 %      Basophil % 0.4 %      Immature Grans % 0.4 %      Neutrophils, Absolute 5.40 10*3/mm3      Lymphocytes, Absolute 1.26 10*3/mm3      Monocytes, Absolute 0.60 10*3/mm3      Eosinophils, Absolute 0.14 10*3/mm3      Basophils, Absolute 0.03 10*3/mm3      Immature Grans, Absolute 0.03 10*3/mm3      nRBC 0.0 /100 WBC     Urinalysis With Culture If Indicated - Urine, Clean Catch [[id number]]  (Normal) Collected:  03/02/19 1755    Specimen:  Urine, Clean Catch Updated:  03/02/19 1758     Color, UA Yellow     Appearance, UA Clear     pH, UA <=5.0     Specific Gravity, UA <=1.005     Glucose, UA Negative     Ketones, UA Negative     Bilirubin, UA Negative     Blood, UA Negative     Protein, UA Negative     Leuk Esterase, UA Negative     Nitrite, UA Negative     Urobilinogen, UA 0.2 E.U./dL    Narrative:       Urine microscopic not indicated.    Procalcitonin [[id number]]  (Abnormal) Collected:  03/01/19 0808    Specimen:  Blood Updated:  03/01/19 0859     Procalcitonin 2.26 ng/mL     Narrative:       As a Marker for Sepsis (Non-Neonates):   1. <0.5 ng/mL represents a low risk of  severe sepsis and/or septic shock.  2. >2 ng/mL represents a high risk of severe sepsis and/or septic shock.    As a Marker for Lower Respiratory Tract Infections that require antibiotic therapy:    PCT on Admission     Antibiotic Therapy       6-12 Hrs later  > 0.5                Strongly Recommended             >0.25 - <0.5         Recommended  0.1 - 0.25           Discouraged              Remeasure/reassess PCT  <0.1                 Strongly Discouraged     Remeasure/reassess PCT                     PCT values of < 0.5 ng/mL do not exclude an infection, because localized infections (without systemic signs) may be associated with such low concentrations, or a systemic infection in its initial stages (< 6 hours). Furthermore, increased PCT can occur without infection. PCT concentrations between 0.5 and 2.0 ng/mL should be interpreted taking into account the patient's history. It is recommended to retest PCT within 6-24 hours if any concentrations < 2 ng/mL are obtained.    Troponin [[id number]]  (Normal) Collected:  03/01/19 0808    Specimen:  Blood Updated:  03/01/19 0856     Troponin T <0.010 ng/mL     Narrative:       Troponin T Reference Range:  <= 0.03 ng/mL-   Negative for AMI  >0.03 ng/mL-     Abnormal for myocardial necrosis.  Clinicians would have to utilize clinical acumen, EKG, Troponin and serial changes to determine if it is an Acute Myocardial Infarction or myocardial injury due to an underlying chronic condition.     Respiratory Panel, PCR - Swab, Nasopharynx [[id number]]  (Normal) Collected:  03/01/19 0327    Specimen:  Swab from Nasopharynx Updated:  03/01/19 0856     ADENOVIRUS, PCR Not Detected     Coronavirus 229E Not Detected     Coronavirus HKU1 Not Detected     Coronavirus NL63 Not Detected     Coronavirus OC43 Not Detected     Human Metapneumovirus Not Detected     Human Rhinovirus/Enterovirus Not Detected     Influenza B PCR Not Detected     Parainfluenza Virus 1 Not Detected      Parainfluenza Virus 2 Not Detected     Parainfluenza Virus 3 Not Detected     Parainfluenza Virus 4 Not Detected     Bordetella pertussis pcr Not Detected     Influenza A H1 2009 PCR Not Detected     Chlamydophila pneumoniae PCR Not Detected     Mycoplasma pneumo by PCR Not Detected     Influenza A PCR Not Detected     Influenza A H3 Not Detected     Influenza A H1 Not Detected     RSV, PCR Not Detected     Bordetella parapertussis PCR Not Detected    Magnesium [[id number]]  (Normal) Collected:  03/01/19 0808    Specimen:  Blood Updated:  03/01/19 0847     Magnesium 2.0 mg/dL     Phosphorus [[id number]]  (Normal) Collected:  03/01/19 0808    Specimen:  Blood Updated:  03/01/19 0847     Phosphorus 2.9 mg/dL     Troponin [[id number]]  (Normal) Collected:  03/01/19 0210    Specimen:  Blood Updated:  03/01/19 0310     Troponin T <0.010 ng/mL     Narrative:       Troponin T Reference Range:  <= 0.03 ng/mL-   Negative for AMI  >0.03 ng/mL-     Abnormal for myocardial necrosis.  Clinicians would have to utilize clinical acumen, EKG, Troponin and serial changes to determine if it is an Acute Myocardial Infarction or myocardial injury due to an underlying chronic condition.     Comprehensive Metabolic Panel [[id number]]  (Abnormal) Collected:  03/01/19 0210    Specimen:  Blood Updated:  03/01/19 0246     Glucose 143 mg/dL      BUN 21 mg/dL      Creatinine 1.28 mg/dL      Sodium 140 mmol/L      Potassium 4.2 mmol/L      Chloride 106 mmol/L      CO2 24.5 mmol/L      Calcium 8.0 mg/dL      Total Protein 6.1 g/dL      Albumin 3.40 g/dL      ALT (SGPT) 12 U/L      AST (SGOT) 15 U/L      Alkaline Phosphatase 53 U/L      Total Bilirubin 0.5 mg/dL      eGFR Non African Amer 39 mL/min/1.73      Globulin 2.7 gm/dL      A/G Ratio 1.3 g/dL      BUN/Creatinine Ratio 16.4     Anion Gap 9.5 mmol/L     Narrative:       The MDRD GFR formula is only valid for adults with stable renal function between ages 18 and 70.    Hemoglobin A1c [[id number]]   (Normal) Collected:  03/01/19 0210    Specimen:  Blood Updated:  03/01/19 0243     Hemoglobin A1C 5.50 %     Narrative:       Hemoglobin A1C Ranges:    Increased Risk for Diabetes  5.7% to 6.4%  Diabetes                     >= 6.5%  Diabetic Goal                < 7.0%    Lactic Acid, Plasma [[id number]]  (Normal) Collected:  03/01/19 0210    Specimen:  Blood Updated:  03/01/19 0243     Lactate 1.5 mmol/L           Imaging Results (most recent)     Procedure Component Value Units Date/Time    US Venous Doppler Lower Extremity Bilateral (duplex) [[id number]] Collected:  03/04/19 1536     Updated:  03/04/19 1539    Narrative:       VENOUS DOPPLER ULTRASOUND, BILATERAL LOWER EXTREMITIES, 3/4/2019     HISTORY:   Bilateral lower extremity edema, swelling for one day. Evaluate for deep  vein thrombosis.     TECHNIQUE:   Venous Doppler ultrasound examination of both legs was performed using  grey-scale, spectral Doppler, and color flow Doppler ultrasound imaging.     FINDINGS:   The examination is negative.  There is no evidence of deep venous  thrombosis from the groin to the lower calf bilaterally. The greater  saphenous veins are also patent.       Impression:       Negative examination.  No evidence of lower extremity DVT on the right  or left.     This report was finalized on 3/4/2019 3:37 PM by Dr. Kirill Loaiza MD.       CT Abdomen Pelvis With Contrast [[id number]] Collected:  03/03/19 0920     Updated:  03/03/19 0952    Narrative:       CT ABDOMEN AND PELVIS WITH IV AND ORAL CONTRAST 03/02/2019     HISTORY:  88-year-old female with abdominal pain since 02/28/2019.     COMPARISON: CT abdomen and pelvis 02/28/2019 from [facility]     TECHNIQUE:   Axial images performed through the abdomen and pelvis following IV and  oral contrast. Multiplanar reconstructed images reviewed. Radiation dose  reduction techniques included automated exposure control or exposure  modulation based on body size. Radiation audit  for CT and nuclear  cardiology exams in the last 12 months: 2.     ABDOMEN FINDINGS:   Small left basilar atelectasis. No effusions. Liver and spleen  unremarkable. Gallbladder distended but no wall thickening or fluid. 1.6  cm cystic lesion off the anterior aspect of the pancreas near the  junction of the body and tail. This does not enhance. This may represent  a pseudocyst or benign cystic neoplasm. Unchanged from prior CT. The  adrenal glands unremarkable. Large right renal peripelvic cyst measuring  up to 6.4 cm. Several small bilateral cortical cysts. Focal bowel wall  thickening involving the proximal small bowel within the left mid  abdomen with adjacent inflammatory change within the mesentery. Several  contained gas collections adjacent to the site of inflammation within  the bowel but no drainable abscess. The region of inflammation measures  about 5 x 7 centimeters in greatest transverse dimensions with  increasing inflammation when comparing patient's outside CT. No evidence  of obstruction. Though there is focal bowel wall thickening no discrete  mass.Several focal outpouchings in the region suggest the possibility of  small bowel diverticula. Colonic diverticulosis.     Pelvis: Uterus surgically absent. Bladder unremarkable. Osseous  structures and soft tissues appear normal.       Impression:       Focal bowel wall thickening involving the small bowel in  the left mid abdomen with surrounding inflammatory changes in the  mesentery and a moderate amount of contained extraluminal gas. There are  several diverticula in the region and this may represent an episode of  acute small bowel diverticulitis. Though this is rare it is a known  complication of small bowel diverticulosis. No evidence of abscess or  drainable fluid collection. No obstruction. This is felt to be clearly  distinct from the left colon which is a more typical location for  diverticulitis. Clinical and imaging follow-up to resolution  recommended  to exclude underlying mass.     These findings were called and discussed with Dr. Kim the on call  hospitalist at the time of this dictation.     This report was finalized on 3/3/2019 9:50 AM by Dr. LACEY Khan MD.       XR Abdomen Flat & Upright [[id number]] Collected:  03/01/19 1606     Updated:  03/01/19 1609    Narrative:       ABDOMEN 3 VIEWS 3/1/2019     HISTORY:   Generalized abdominal pain for 2 days, worsening left lower quadrant  with associated diarrhea.     FINDINGS:  3 views of the abdomen demonstrate mild gaseous distention of the small  bowel with a normal colonic gas pattern. There is some residual  radiographic contrast seen within the colon. Fecal matter is seen in  colon. Findings most likely reflect ileus or enteritis. There are no  abnormal abdominal calcifications. The bony structures are normal.       Impression:       Mild gaseous distention of the small bowel with normal  colonic gas pattern and large amount of fecal matter in the colon.  Findings may reflect ileus or enteritis. No evidence of free air.     This report was finalized on 3/1/2019 4:07 PM by Dr. Kirill Loaiza MD.           reviewed    ECG/EMG Results (most recent)     Procedure Component Value Units Date/Time    ECG 12 Lead [[id number]] Collected:  03/01/19 0704     Updated:  03/01/19 0753    Narrative:       HEART RATE= 67  bpm  RR Interval= 892  ms  MO Interval= 200  ms  P Horizontal Axis= 12  deg  P Front Axis= -4  deg  QRSD Interval= 89  ms  QT Interval= 434  ms  QRS Axis= 5  deg  T Wave Axis= -4  deg  - OTHERWISE NORMAL ECG -  Sinus rhythm  Atrial premature complex  Low voltage, precordial leads  No change from prior tracing  Electronically Signed By: Dilia Peter ([facility]) 01-Mar-2019 07:53:38  Date and Time of Study: 2019-03-01 07:04:38        reviewed    Assessment/Plan     Ileal Diverticulitis  Colonic DIverticulosis  OP Burning    Nothing to add to present care with the exception of treating her  "for thrush if it declares itself. However in reviewing her Meds list she has been on NSAIDS in the past and one can experience inflammation from these thru out the GI tract which may explain the unusual location of this Pt's \"Diverticulitis\". Would avoid all NSAIDS from this point.  I discussed the patients findings and my recommendations with patient.     Haim Wiley MD  03/04/19  5:54 PM    Time: Not recorded    "

## 2019-03-05 LAB
BASOPHILS # BLD AUTO: 0.02 10*3/MM3 (ref 0–0.2)
BASOPHILS NFR BLD AUTO: 0.3 % (ref 0–1.5)
DEPRECATED RDW RBC AUTO: 43 FL (ref 37–54)
EOSINOPHIL # BLD AUTO: 0.21 10*3/MM3 (ref 0–0.4)
EOSINOPHIL NFR BLD AUTO: 3.3 % (ref 0.3–6.2)
ERYTHROCYTE [DISTWIDTH] IN BLOOD BY AUTOMATED COUNT: 12.3 % (ref 12.3–15.4)
GLUCOSE BLDC GLUCOMTR-MCNC: 108 MG/DL (ref 70–130)
GLUCOSE BLDC GLUCOMTR-MCNC: 113 MG/DL (ref 70–130)
GLUCOSE BLDC GLUCOMTR-MCNC: 137 MG/DL (ref 70–130)
GLUCOSE BLDC GLUCOMTR-MCNC: 88 MG/DL (ref 70–130)
HCT VFR BLD AUTO: 34 % (ref 34–46.6)
HGB BLD-MCNC: 11 G/DL (ref 12–15.9)
IMM GRANULOCYTES # BLD AUTO: 0.02 10*3/MM3 (ref 0–0.05)
IMM GRANULOCYTES NFR BLD AUTO: 0.3 % (ref 0–0.5)
LYMPHOCYTES # BLD AUTO: 1.09 10*3/MM3 (ref 0.7–3.1)
LYMPHOCYTES NFR BLD AUTO: 17.3 % (ref 19.6–45.3)
MCH RBC QN AUTO: 30.6 PG (ref 26.6–33)
MCHC RBC AUTO-ENTMCNC: 32.4 G/DL (ref 31.5–35.7)
MCV RBC AUTO: 94.7 FL (ref 79–97)
MONOCYTES # BLD AUTO: 0.69 10*3/MM3 (ref 0.1–0.9)
MONOCYTES NFR BLD AUTO: 11 % (ref 5–12)
NEUTROPHILS # BLD AUTO: 4.26 10*3/MM3 (ref 1.4–7)
NEUTROPHILS NFR BLD AUTO: 67.8 % (ref 42.7–76)
NRBC BLD AUTO-RTO: 0 /100 WBC (ref 0–0)
PLATELET # BLD AUTO: 229 10*3/MM3 (ref 140–450)
PMV BLD AUTO: 10.2 FL (ref 6–12)
RBC # BLD AUTO: 3.59 10*6/MM3 (ref 3.77–5.28)
WBC NRBC COR # BLD: 6.29 10*3/MM3 (ref 3.4–10.8)

## 2019-03-05 PROCEDURE — 85025 COMPLETE CBC W/AUTO DIFF WBC: CPT | Performed by: SURGERY

## 2019-03-05 PROCEDURE — 25010000002 MEROPENEM PER 100 MG: Performed by: INTERNAL MEDICINE

## 2019-03-05 PROCEDURE — 99233 SBSQ HOSP IP/OBS HIGH 50: CPT | Performed by: SURGERY

## 2019-03-05 PROCEDURE — 25010000002 ONDANSETRON PER 1 MG: Performed by: HOSPITALIST

## 2019-03-05 PROCEDURE — 82962 GLUCOSE BLOOD TEST: CPT

## 2019-03-05 PROCEDURE — 99232 SBSQ HOSP IP/OBS MODERATE 35: CPT | Performed by: NURSE PRACTITIONER

## 2019-03-05 PROCEDURE — 25010000002 ENOXAPARIN PER 10 MG: Performed by: HOSPITALIST

## 2019-03-05 PROCEDURE — 99231 SBSQ HOSP IP/OBS SF/LOW 25: CPT | Performed by: INTERNAL MEDICINE

## 2019-03-05 RX ORDER — ONDANSETRON 4 MG/1
4 TABLET, FILM COATED ORAL EVERY 6 HOURS
Status: DISCONTINUED | OUTPATIENT
Start: 2019-03-05 | End: 2019-03-06 | Stop reason: HOSPADM

## 2019-03-05 RX ORDER — DOCOSANOL 100 MG/G
CREAM TOPICAL
Status: DISCONTINUED | OUTPATIENT
Start: 2019-03-05 | End: 2019-03-06 | Stop reason: HOSPADM

## 2019-03-05 RX ORDER — CLINDAMYCIN PHOSPHATE 900 MG/50ML
900 INJECTION INTRAVENOUS EVERY 8 HOURS
Status: DISCONTINUED | OUTPATIENT
Start: 2019-03-05 | End: 2019-03-06 | Stop reason: HOSPADM

## 2019-03-05 RX ADMIN — ONDANSETRON 4 MG: 4 TABLET, FILM COATED ORAL at 15:56

## 2019-03-05 RX ADMIN — CLINDAMYCIN IN 5 PERCENT DEXTROSE 900 MG: 18 INJECTION, SOLUTION INTRAVENOUS at 15:55

## 2019-03-05 RX ADMIN — SODIUM CHLORIDE, PRESERVATIVE FREE 3 ML: 5 INJECTION INTRAVENOUS at 20:48

## 2019-03-05 RX ADMIN — MEROPENEM 500 MG: 500 INJECTION, POWDER, FOR SOLUTION INTRAVENOUS at 22:44

## 2019-03-05 RX ADMIN — POLYVINYL ALCOHOL 1 DROP: 14 SOLUTION/ DROPS OPHTHALMIC at 20:48

## 2019-03-05 RX ADMIN — METRONIDAZOLE 500 MG: 500 INJECTION, SOLUTION INTRAVENOUS at 08:00

## 2019-03-05 RX ADMIN — ONDANSETRON 4 MG: 4 TABLET, FILM COATED ORAL at 20:46

## 2019-03-05 RX ADMIN — TRAMADOL HYDROCHLORIDE 50 MG: 50 TABLET, FILM COATED ORAL at 23:04

## 2019-03-05 RX ADMIN — SODIUM CHLORIDE, PRESERVATIVE FREE 10 ML: 5 INJECTION INTRAVENOUS at 08:05

## 2019-03-05 RX ADMIN — MEROPENEM 500 MG: 500 INJECTION, POWDER, FOR SOLUTION INTRAVENOUS at 10:14

## 2019-03-05 RX ADMIN — FAMOTIDINE 20 MG: 20 TABLET, FILM COATED ORAL at 07:59

## 2019-03-05 RX ADMIN — MONTELUKAST SODIUM 10 MG: 10 TABLET, FILM COATED ORAL at 20:49

## 2019-03-05 RX ADMIN — ONDANSETRON 4 MG: 2 INJECTION, SOLUTION INTRAMUSCULAR; INTRAVENOUS at 11:07

## 2019-03-05 RX ADMIN — DOCOSANOL: 100 CREAM TOPICAL at 15:57

## 2019-03-05 RX ADMIN — LEVOTHYROXINE SODIUM 50 MCG: 50 TABLET ORAL at 06:25

## 2019-03-05 RX ADMIN — Medication 1 CAPSULE: at 07:59

## 2019-03-05 RX ADMIN — DOCOSANOL 1 APPLICATION: 100 CREAM TOPICAL at 22:46

## 2019-03-05 RX ADMIN — POLYVINYL ALCOHOL 1 DROP: 14 SOLUTION/ DROPS OPHTHALMIC at 07:59

## 2019-03-05 RX ADMIN — ENOXAPARIN SODIUM 40 MG: 100 INJECTION SUBCUTANEOUS at 08:00

## 2019-03-05 NOTE — NURSING NOTE
Continued Stay Note  JOSE G Dean     Patient Name: Shyanne Dan  MRN: [id number]  Today's Date: 3/5/2019    Admit Date: 2/28/2019    Discharge Plan     Row Name 03/05/19 1427       Plan    Plan Comments  Spoke with Mrs Dan concerning her discharge plan and her 's care at home.  She has been upset about his needs and whether she will be able to continue to meet them.  I gave her information and names of people who are resources for veerans and home benefits.  She stated she was not interested in any of that.  I suggested home health for her and she did not want any of that either.  Plan is home.  Will continue to follow        Discharge Codes    No documentation.             Sary Stevens RN

## 2019-03-05 NOTE — PROGRESS NOTES
"SERVICE: [facility] HOSPITALIST    CONSULTANTS: Surgery, GI    CHIEF COMPLAINT: Follow-up acute diverticulitis    SUBJECTIVE: The patient reports that she continues with constant nausea without vomiting.  She notes bad taste in her mouth and tolerating her diet but not hungry at all for food.  She is ambulating in the hallways.  She notes about 3 diarrhea stools daily, large amount but abdominal pain gone. She also complains about lower extremity swelling. She denies f/c/cough/soa/chest pain/v/abdominal pain or other new concerns.    OBJECTIVE:    /62 (BP Location: Right arm, Patient Position: Sitting)   Pulse 76   Temp 98.1 °F (36.7 °C) (Oral)   Resp 16   Ht 165.1 cm (65\")   Wt 82.6 kg (182 lb 1.6 oz)   SpO2 96%   BMI 30.30 kg/m²     MEDS/LABS REVIEWED AND ORDERED    budesonide-formoterol 2 puff Inhalation BID - RT   clindamycin 900 mg Intravenous Q8H   cyanocobalamin 1,000 mcg Intramuscular Weekly   docosanol  Topical 5x Daily   enoxaparin 40 mg Subcutaneous Q24H   famotidine 20 mg Oral Daily   insulin aspart 0-9 Units Subcutaneous 4x Daily AC & at Bedtime   lactobacillus acidophilus 1 capsule Oral Daily   levothyroxine 50 mcg Oral Q AM   meropenem 500 mg Intravenous Q12H   montelukast 10 mg Oral Nightly   ondansetron 4 mg Oral Q6H   polyvinyl alcohol 1 drop Both Eyes BID   sodium chloride 3 mL Intravenous Q12H     Physical Exam   Constitutional: She is oriented to person, place, and time. She appears well-developed and well-nourished.   HENT:   Head: Normocephalic and atraumatic.   Cardiovascular: Normal rate and regular rhythm.   Pulmonary/Chest: Effort normal and breath sounds normal.   Abdominal: Soft.   Hyperactive bowel sounds   Musculoskeletal: She exhibits edema.   1+ bilateral LE pitting edema   Neurological: She is alert and oriented to person, place, and time.   Skin: Skin is warm and dry. No erythema.   Psychiatric: She has a normal mood and affect. Her behavior is " normal.   Vitals reviewed.      LAB/DIAGNOSTICS:    Lab Results (last 24 hours)     Procedure Component Value Units Date/Time    POC Glucose Once [[id number]]  (Normal) Collected:  03/05/19 0721    Specimen:  Blood Updated:  03/05/19 0739     Glucose 88 mg/dL     CBC & Differential [[id number]] Collected:  03/05/19 0400    Specimen:  Blood Updated:  03/05/19 0504    Narrative:       The following orders were created for panel order CBC & Differential.  Procedure                               Abnormality         Status                     ---------                               -----------         ------                     CBC Auto Differential[[id number]]        Abnormal            Final result                 Please view results for these tests on the individual orders.    CBC Auto Differential [[id number]]  (Abnormal) Collected:  03/05/19 0400    Specimen:  Blood Updated:  03/05/19 0504     WBC 6.29 10*3/mm3      RBC 3.59 10*6/mm3      Hemoglobin 11.0 g/dL      Hematocrit 34.0 %      MCV 94.7 fL      MCH 30.6 pg      MCHC 32.4 g/dL      RDW 12.3 %      RDW-SD 43.0 fl      MPV 10.2 fL      Platelets 229 10*3/mm3      Neutrophil % 67.8 %      Lymphocyte % 17.3 %      Monocyte % 11.0 %      Eosinophil % 3.3 %      Basophil % 0.3 %      Immature Grans % 0.3 %      Neutrophils, Absolute 4.26 10*3/mm3      Lymphocytes, Absolute 1.09 10*3/mm3      Monocytes, Absolute 0.69 10*3/mm3      Eosinophils, Absolute 0.21 10*3/mm3      Basophils, Absolute 0.02 10*3/mm3      Immature Grans, Absolute 0.02 10*3/mm3      nRBC 0.0 /100 WBC     Blood Culture - Blood, Hand, Right [[id number]] Collected:  03/01/19 0235    Specimen:  Blood from Hand, Right Updated:  03/05/19 0245     Blood Culture No growth at 4 days    Blood Culture - Blood, Hand, Right [[id number]] Collected:  03/01/19 0210    Specimen:  Blood from Hand, Right Updated:  03/05/19 0230     Blood Culture No growth at 4 days    POC Glucose Once [[id number]]  (Normal) Collected:   03/04/19 1956    Specimen:  Blood Updated:  03/04/19 2004     Glucose 103 mg/dL     POC Glucose Once [[id number]]  (Normal) Collected:  03/04/19 1635    Specimen:  Blood Updated:  03/04/19 1642     Glucose 83 mg/dL         ECG 12 Lead   Final Result   HEART RATE= 67  bpm   RR Interval= 892  ms   OR Interval= 200  ms   P Horizontal Axis= 12  deg   P Front Axis= -4  deg   QRSD Interval= 89  ms   QT Interval= 434  ms   QRS Axis= 5  deg   T Wave Axis= -4  deg   - OTHERWISE NORMAL ECG -   Sinus rhythm   Atrial premature complex   Low voltage, precordial leads   No change from prior tracing   Electronically Signed By: Dilia Peter ([facility]) 01-Mar-2019 07:53:38   Date and Time of Study: 2019-03-01 07:04:38           Us Venous Doppler Lower Extremity Bilateral (duplex)    Result Date: 3/4/2019  Negative examination.  No evidence of lower extremity DVT on the right or left.  This report was finalized on 3/4/2019 3:37 PM by Dr. Kirill Loaiza MD.      ASSESSMENT/PLAN:  Sepsis secondary to acute diverticulitis: Surgery following, GI consulted  Change to merrem and clindamycin after d/w Dr. Garcia, due to nausea/intolerance with flagyl, monitor for effect and schedule zofran  Now on fulls, monitor  Consult dietitian to  for diverticular diet  Improving daily, monitor     Bilateral lower extremity edema: secondary to previous IVFs likely, Venous duplex negative for DVTs  Encourage ambulation, elevation    Fever blister: add docosanol     DM 2: A1C 5.5%  Continue holding home medications, continue moderate dose sliding scale insulin with Accu-Cheks AC/at bedtime, resume home meds at discharge     Hypothyroidism: continue home daily dose of levothyroxine 50 mcg     Hypertension: Blood pressure overall at goal off home medication, resume if needed     GERD: No current acute issues, continue Pepcid daily     Normocytic anemia: Hemoglobin stable at 10.1, no active blood loss noted     CKD 3: Baseline creatinine approximately  1.1-1.2, currently 1.06, recheck in a.m. with discontinuation of IV fluid     OA: No current acute issues on tramadol as needed     Asthma: No acute issues on Symbicort, add home Singulair     Seizure disorder: No acute issues     B12 deficiency: injection given here 3/3/19, will need   Plan f/u Derrick Lipscomb MD to arrange     H/O Breast Cancer and Mastectomy: Resume home tamoxifen when able    PLAN FOR DISPOSITION: home when able    PEPE Keller  Hospitalist, [facility]  03/05/19  8:08 AM

## 2019-03-05 NOTE — PROGRESS NOTES
I am seeing her for follow-up on her abdominal pain.  She says she has O slightly upset stomach this morning.  She denies any nausea or vomiting.  She says her pain is improved.  She tolerated clear liquids.  Her white blood count is normal.  I reviewed Dr. Wiley's and Ms. Del Rosario's notes.  Her abdomen is soft with subjective mild left mid abdominal tenderness.  She has normoactive bowel sounds her lungs are clear and equal.  Her lower extremity duplex showed no evidence of DVT.  I would continue the intravenous antibiotics we will try her on full liquids.

## 2019-03-05 NOTE — PROGRESS NOTES
GI Daily Progress Note    Assessment/Plan:      Sepsis (CMS/[facility])       LOS: 5 days     Shyanne Dan is a 88 y.o. female who was admitted with SB Diverticulitis. She reports the symptoms are improving with treatment. Tolerating Full liquids and her BLE edema is Negative for DVT    Subjective:    Patient expresses abdominal pain and BLE swelling  Patient denies vomiting, diarrhea and bloody stools    Objective:    Vital signs in last 24 hours:  Temp:  [97.3 °F (36.3 °C)-98.5 °F (36.9 °C)] 97.3 °F (36.3 °C)  Heart Rate:  [68-79] 79  Resp:  [16-18] 16  BP: (117-154)/(62-87) 143/65    Intake/Output last 3 shifts:  I/O last 3 completed shifts:  In: 360 [P.O.:360]  Out: 500 [Urine:500]  Intake/Output this shift:  I/O this shift:  In: 320 [P.O.:320]  Out: -     Physical Exam:Abdomen  Sounds Normal Active Bowel Sounds   Distension Soft   Tenderness Mildly Tender     Imaging Results (last 72 hours)     Procedure Component Value Units Date/Time    US Venous Doppler Lower Extremity Bilateral (duplex) [[id number]] Collected:  03/04/19 1536     Updated:  03/04/19 1539    Narrative:       VENOUS DOPPLER ULTRASOUND, BILATERAL LOWER EXTREMITIES, 3/4/2019     HISTORY:   Bilateral lower extremity edema, swelling for one day. Evaluate for deep  vein thrombosis.     TECHNIQUE:   Venous Doppler ultrasound examination of both legs was performed using  grey-scale, spectral Doppler, and color flow Doppler ultrasound imaging.     FINDINGS:   The examination is negative.  There is no evidence of deep venous  thrombosis from the groin to the lower calf bilaterally. The greater  saphenous veins are also patent.       Impression:       Negative examination.  No evidence of lower extremity DVT on the right  or left.     This report was finalized on 3/4/2019 3:37 PM by Dr. Kirill Loaiza MD.       CT Abdomen Pelvis With Contrast [[id number]] Collected:  03/03/19 0920     Updated:  03/03/19 0952    Narrative:       CT ABDOMEN AND PELVIS WITH IV  AND ORAL CONTRAST 03/02/2019     HISTORY:  88-year-old female with abdominal pain since 02/28/2019.     COMPARISON: CT abdomen and pelvis 02/28/2019 from [facility]     TECHNIQUE:   Axial images performed through the abdomen and pelvis following IV and  oral contrast. Multiplanar reconstructed images reviewed. Radiation dose  reduction techniques included automated exposure control or exposure  modulation based on body size. Radiation audit for CT and nuclear  cardiology exams in the last 12 months: 2.     ABDOMEN FINDINGS:   Small left basilar atelectasis. No effusions. Liver and spleen  unremarkable. Gallbladder distended but no wall thickening or fluid. 1.6  cm cystic lesion off the anterior aspect of the pancreas near the  junction of the body and tail. This does not enhance. This may represent  a pseudocyst or benign cystic neoplasm. Unchanged from prior CT. The  adrenal glands unremarkable. Large right renal peripelvic cyst measuring  up to 6.4 cm. Several small bilateral cortical cysts. Focal bowel wall  thickening involving the proximal small bowel within the left mid  abdomen with adjacent inflammatory change within the mesentery. Several  contained gas collections adjacent to the site of inflammation within  the bowel but no drainable abscess. The region of inflammation measures  about 5 x 7 centimeters in greatest transverse dimensions with  increasing inflammation when comparing patient's outside CT. No evidence  of obstruction. Though there is focal bowel wall thickening no discrete  mass.Several focal outpouchings in the region suggest the possibility of  small bowel diverticula. Colonic diverticulosis.     Pelvis: Uterus surgically absent. Bladder unremarkable. Osseous  structures and soft tissues appear normal.       Impression:       Focal bowel wall thickening involving the small bowel in  the left mid abdomen with surrounding inflammatory changes in the  mesentery and a moderate  amount of contained extraluminal gas. There are  several diverticula in the region and this may represent an episode of  acute small bowel diverticulitis. Though this is rare it is a known  complication of small bowel diverticulosis. No evidence of abscess or  drainable fluid collection. No obstruction. This is felt to be clearly  distinct from the left colon which is a more typical location for  diverticulitis. Clinical and imaging follow-up to resolution recommended  to exclude underlying mass.     These findings were called and discussed with Dr. Kim the on call  hospitalist at the time of this dictation.     This report was finalized on 3/3/2019 9:50 AM by Dr. LACEY Khan MD.             WBC   Date Value Ref Range Status   03/05/2019 6.29 3.40 - 10.80 10*3/mm3 Final     RBC   Date Value Ref Range Status   03/05/2019 3.59 (L) 3.77 - 5.28 10*6/mm3 Final     Hemoglobin   Date Value Ref Range Status   03/05/2019 11.0 (L) 12.0 - 15.9 g/dL Final     Hematocrit   Date Value Ref Range Status   03/05/2019 34.0 34.0 - 46.6 % Final     MCV   Date Value Ref Range Status   03/05/2019 94.7 79.0 - 97.0 fL Final     MCH   Date Value Ref Range Status   03/05/2019 30.6 26.6 - 33.0 pg Final     MCHC   Date Value Ref Range Status   03/05/2019 32.4 31.5 - 35.7 g/dL Final     RDW   Date Value Ref Range Status   03/05/2019 12.3 12.3 - 15.4 % Final     RDW-SD   Date Value Ref Range Status   03/05/2019 43.0 37.0 - 54.0 fl Final     MPV   Date Value Ref Range Status   03/05/2019 10.2 6.0 - 12.0 fL Final     Platelets   Date Value Ref Range Status   03/05/2019 229 140 - 450 10*3/mm3 Final     Neutrophil %   Date Value Ref Range Status   03/05/2019 67.8 42.7 - 76.0 % Final     Lymphocyte %   Date Value Ref Range Status   03/05/2019 17.3 (L) 19.6 - 45.3 % Final     Monocyte %   Date Value Ref Range Status   03/05/2019 11.0 5.0 - 12.0 % Final     Eosinophil %   Date Value Ref Range Status   03/05/2019 3.3 0.3 - 6.2 % Final      Basophil %   Date Value Ref Range Status   03/05/2019 0.3 0.0 - 1.5 % Final     Immature Grans %   Date Value Ref Range Status   03/05/2019 0.3 0.0 - 0.5 % Final     Neutrophils, Absolute   Date Value Ref Range Status   03/05/2019 4.26 1.40 - 7.00 10*3/mm3 Final     Lymphocytes, Absolute   Date Value Ref Range Status   03/05/2019 1.09 0.70 - 3.10 10*3/mm3 Final     Monocytes, Absolute   Date Value Ref Range Status   03/05/2019 0.69 0.10 - 0.90 10*3/mm3 Final     Eosinophils, Absolute   Date Value Ref Range Status   03/05/2019 0.21 0.00 - 0.40 10*3/mm3 Final     Basophils, Absolute   Date Value Ref Range Status   03/05/2019 0.02 0.00 - 0.20 10*3/mm3 Final     Immature Grans, Absolute   Date Value Ref Range Status   03/05/2019 0.02 0.00 - 0.05 10*3/mm3 Final     nRBC   Date Value Ref Range Status   03/05/2019 0.0 0.0 - 0.0 /100 WBC Final       Glucose   Date Value Ref Range Status   03/04/2019 74 65 - 99 mg/dL Final     Sodium   Date Value Ref Range Status   03/04/2019 141 136 - 145 mmol/L Final     Potassium   Date Value Ref Range Status   03/04/2019 3.9 3.5 - 5.2 mmol/L Final     CO2   Date Value Ref Range Status   03/04/2019 22.8 22.0 - 29.0 mmol/L Final     Chloride   Date Value Ref Range Status   03/04/2019 106 98 - 107 mmol/L Final     Anion Gap   Date Value Ref Range Status   03/04/2019 12.2 mmol/L Final     Creatinine   Date Value Ref Range Status   03/04/2019 1.06 (H) 0.57 - 1.00 mg/dL Final     BUN   Date Value Ref Range Status   03/04/2019 18 8 - 23 mg/dL Final     BUN/Creatinine Ratio   Date Value Ref Range Status   03/04/2019 17.0 7.0 - 25.0 Final     Calcium   Date Value Ref Range Status   03/04/2019 7.8 (L) 8.8 - 10.5 mg/dL Final     eGFR Non  Amer   Date Value Ref Range Status   03/04/2019 49 (L) >60 mL/min/1.73 Final     Alkaline Phosphatase   Date Value Ref Range Status   03/03/2019 44 40 - 129 U/L Final     Total Protein   Date Value Ref Range Status   03/03/2019 6.1 6.0 - 8.5 g/dL Final      ALT (SGPT)   Date Value Ref Range Status   03/03/2019 9 5 - 33 U/L Final     AST (SGOT)   Date Value Ref Range Status   03/03/2019 12 5 - 32 U/L Final     Total Bilirubin   Date Value Ref Range Status   03/03/2019 0.6 0.2 - 1.2 mg/dL Final     Albumin   Date Value Ref Range Status   03/03/2019 3.20 (L) 3.50 - 5.20 g/dL Final     Globulin   Date Value Ref Range Status   03/03/2019 2.9 gm/dL Final     Ileal Diverticulitis  Colonic DIverticulosis  OP Burning      Overall doing well and responding to treatment feel her LE swelling is from her sodium laden IVF/ABX. Nothing really to add ADC tomorrow after attempting a diet.

## 2019-03-05 NOTE — CONSULTS
Adult Nutrition  Assessment/PES    Patient Name:  Shyanne Dan  YOB: 1930  MRN: [id number]  Admit Date:  2/28/2019    Assessment Date:  3/5/2019    Comments:  Edu provided on divirticular dz osis vs itis. See below.  Pt mostly concerned about taking care of her blind  at home, only has 6 hrs of week of help with his shower.  Passed concerns along to .     Reason for Assessment     Row Name 03/05/19 1146          Reason for Assessment    Reason For Assessment  nurse/nurse practitioner consult diet edu diviritcular dz     Diagnosis  gastrointestinal disease diviriticulitis hx breast ca, dm , htn, ckd         Nutrition/Diet History     Row Name 03/05/19 1147          Nutrition/Diet History    Typical Food/Fluid Intake  Pt headache from pump going off, got RN to stop. At return pt pleasant & suprised she can't have salad for awhile but gets teary when talking about home &  whom she takes care of who is blind. She reports not sleeping with him b/c he uses electric blanket that is hot but worries he will fall when using urinal so she up/down all night. Gets 6 hrs help per week with his shower so he doesn't fall. Reports her daughter has been ill who lives in [facility], [place] but is here now helping had gallballder, then PNA, then really bag shingles. Pt cries & declines  consult. Also reports [facility] doubting her word when she calls to tell them an RN hasn't been in 4 months. Spouse is  & goes to [facility]. Passed concerns along to .          Anthropometrics     Row Name 03/05/19 1149 03/05/19 0642       Anthropometrics    Weight  82.6 kg (182 lb 1.6 oz)  82.6 kg (182 lb)       Body Mass Index (BMI)    BMI Assessment  BMI 30-34.9: obesity grade I  --        Labs/Tests/Procedures/Meds     Row Name 03/05/19 1149          Labs/Procedures/Meds    Lab Results Reviewed  reviewed        Diagnostic Tests/Procedures    Diagnostic Test/Procedure Reviewed  reviewed      Diagnostic Test/Procedures Comments  abd ct        Medications    Pertinent Medications Reviewed  reviewed     Pertinent Medications Comments  B12 injection, insulin, risaquad           Estimated/Assessed Needs     Row Name 03/05/19 1150          Calculation Measurements    Weight Used For Calculations  82.6 kg (182 lb 1.6 oz)        Estimated/Assessed Needs    Additional Documentation  Calorie Requirements (Group);Protein Requirements (Group);Fluid Requirements (Group)        Calorie Requirements    Estimated Calorie Need Method  [place] Jeor     Estimated Calorie Requirement Comment  1510 kcal ( mifflin 1.2 )  170 gm CHO, 45% kcal                                                                                         Protein Requirements    Est Protein Requirement Amount (gms/kg)  0.8 gm protein hx  CKD     Estimated Protein Requirements (gms/day)  66.08        Fluid Requirements    Estimated Fluid Requirements (mL/day)  1510     Estimated Fluid Requirement Method  RDA Method     RDA Method (mL)  1510               Nutrition Prescription Ordered     Row Name 03/05/19 1151          Nutrition Prescription PO    Current PO Diet  Full Liquid         Evaluation of Received Nutrient/Fluid Intake     Row Name 03/05/19 1151 03/05/19 1150       Calculation Measurements    Weight Used For Calculations  --  82.6 kg (182 lb 1.6 oz)       Fluid Intake Evaluation    Oral Fluid (mL)  436 29%  --       PO Evaluation    Number of Meals  2  --    % PO Intake  75  --        Evaluation of Prescribed Nutrient/Fluid Intake     Row Name 03/05/19 1150          Calculation Measurements    Weight Used For Calculations  82.6 kg (182 lb 1.6 oz)             Problem/Interventions:  Problem 1     Row Name 03/05/19 1152          Nutrition Diagnoses Problem 1    Problem 1  Altered GI Function     Etiology (related to)  Medical Diagnosis     Gastrointestinal  Diverticulitis     Signs/Symptoms (evidenced by)  Report/Observation                  Intervention Goal     Row Name 03/05/19 1152          Intervention Goal    General  Provide information regarding MNT for treatment/condition     PO  PO intake (%)     PO Intake %  75 % or greater     Weight  No significant weight loss         Nutrition Intervention     Row Name 03/05/19 1153          Nutrition Intervention    RD/Tech Action  Encourage intake;Follow Tx progress           Education/Evaluation     Row Name 03/05/19 1153          Education    Education  Education topics Edu pt on diviritcular dz, itis vs osis. Low fiber goal now once healed & MD says ok transition to high fiber. No fresh veggies, limit gas producing cooked ones. Canned fruit, no beans, Banana/melon ok (not eating peels/seeds). Avoid nuts/seeds/corn now.     Education Topics  GI disease;Fiber Avoid whole grains until healed then transition to them. NIH Divirticular Dz (15pgs), Low Fiber for Itis, High Fiber for osis provided with card.         Monitor/Evaluation    Monitor  Per protocol;PO intake;I&O;Pertinent labs;Weight;Symptoms     Education Follow-up  Reinforce PRN Pt verbalized understanding but worried about her  & fact that she cannot have salad right now. Expect fair compliance. Pt pre-occupied with spouse at home & his care.            Electronically signed by:  June De La Vega RD  03/05/19 11:56 AM

## 2019-03-06 VITALS
WEIGHT: 182.1 LBS | HEART RATE: 75 BPM | TEMPERATURE: 98.2 F | RESPIRATION RATE: 16 BRPM | HEIGHT: 65 IN | DIASTOLIC BLOOD PRESSURE: 74 MMHG | OXYGEN SATURATION: 95 % | BODY MASS INDEX: 30.34 KG/M2 | SYSTOLIC BLOOD PRESSURE: 168 MMHG

## 2019-03-06 LAB
ANION GAP SERPL CALCULATED.3IONS-SCNC: 9.3 MMOL/L
BACTERIA SPEC AEROBE CULT: NORMAL
BACTERIA SPEC AEROBE CULT: NORMAL
BASOPHILS # BLD AUTO: 0.03 10*3/MM3 (ref 0–0.2)
BASOPHILS NFR BLD AUTO: 0.5 % (ref 0–1.5)
BUN BLD-MCNC: 13 MG/DL (ref 8–23)
BUN/CREAT SERPL: 12.5 (ref 7–25)
CALCIUM SPEC-SCNC: 8.3 MG/DL (ref 8.8–10.5)
CHLORIDE SERPL-SCNC: 107 MMOL/L (ref 98–107)
CO2 SERPL-SCNC: 25.7 MMOL/L (ref 22–29)
CREAT BLD-MCNC: 1.04 MG/DL (ref 0.57–1)
DEPRECATED RDW RBC AUTO: 42.2 FL (ref 37–54)
EOSINOPHIL # BLD AUTO: 0.23 10*3/MM3 (ref 0–0.4)
EOSINOPHIL NFR BLD AUTO: 3.9 % (ref 0.3–6.2)
ERYTHROCYTE [DISTWIDTH] IN BLOOD BY AUTOMATED COUNT: 12.4 % (ref 12.3–15.4)
GFR SERPL CREATININE-BSD FRML MDRD: 50 ML/MIN/1.73
GLUCOSE BLD-MCNC: 102 MG/DL (ref 65–99)
GLUCOSE BLDC GLUCOMTR-MCNC: 103 MG/DL (ref 70–130)
GLUCOSE BLDC GLUCOMTR-MCNC: 106 MG/DL (ref 70–130)
HCT VFR BLD AUTO: 33.3 % (ref 34–46.6)
HGB BLD-MCNC: 10.9 G/DL (ref 12–15.9)
IMM GRANULOCYTES # BLD AUTO: 0.02 10*3/MM3 (ref 0–0.05)
IMM GRANULOCYTES NFR BLD AUTO: 0.3 % (ref 0–0.5)
LYMPHOCYTES # BLD AUTO: 1.19 10*3/MM3 (ref 0.7–3.1)
LYMPHOCYTES NFR BLD AUTO: 20.3 % (ref 19.6–45.3)
MCH RBC QN AUTO: 30.4 PG (ref 26.6–33)
MCHC RBC AUTO-ENTMCNC: 32.7 G/DL (ref 31.5–35.7)
MCV RBC AUTO: 92.8 FL (ref 79–97)
MONOCYTES # BLD AUTO: 0.76 10*3/MM3 (ref 0.1–0.9)
MONOCYTES NFR BLD AUTO: 13 % (ref 5–12)
NEUTROPHILS # BLD AUTO: 3.62 10*3/MM3 (ref 1.4–7)
NEUTROPHILS NFR BLD AUTO: 62 % (ref 42.7–76)
NRBC BLD AUTO-RTO: 0 /100 WBC (ref 0–0)
PLATELET # BLD AUTO: 239 10*3/MM3 (ref 140–450)
PMV BLD AUTO: 9.9 FL (ref 6–12)
POTASSIUM BLD-SCNC: 3.5 MMOL/L (ref 3.5–5.2)
RBC # BLD AUTO: 3.59 10*6/MM3 (ref 3.77–5.28)
SODIUM BLD-SCNC: 142 MMOL/L (ref 136–145)
WBC NRBC COR # BLD: 5.85 10*3/MM3 (ref 3.4–10.8)

## 2019-03-06 PROCEDURE — 80048 BASIC METABOLIC PNL TOTAL CA: CPT | Performed by: NURSE PRACTITIONER

## 2019-03-06 PROCEDURE — 25010000002 ENOXAPARIN PER 10 MG: Performed by: HOSPITALIST

## 2019-03-06 PROCEDURE — 99239 HOSP IP/OBS DSCHRG MGMT >30: CPT | Performed by: NURSE PRACTITIONER

## 2019-03-06 PROCEDURE — 85025 COMPLETE CBC W/AUTO DIFF WBC: CPT | Performed by: NURSE PRACTITIONER

## 2019-03-06 PROCEDURE — 99232 SBSQ HOSP IP/OBS MODERATE 35: CPT | Performed by: SURGERY

## 2019-03-06 PROCEDURE — 82962 GLUCOSE BLOOD TEST: CPT

## 2019-03-06 PROCEDURE — 25010000002 MEROPENEM PER 100 MG: Performed by: INTERNAL MEDICINE

## 2019-03-06 RX ORDER — CLINDAMYCIN HYDROCHLORIDE 300 MG/1
900 CAPSULE ORAL 3 TIMES DAILY
Qty: 36 CAPSULE | Refills: 0 | Status: SHIPPED | OUTPATIENT
Start: 2019-03-06 | End: 2021-01-18

## 2019-03-06 RX ORDER — L.ACID,PARA/B.BIFIDUM/S.THERM 8B CELL
1 CAPSULE ORAL DAILY
Qty: 30 CAPSULE | Refills: 1 | Status: SHIPPED | OUTPATIENT
Start: 2019-03-07 | End: 2021-06-05

## 2019-03-06 RX ORDER — LOSARTAN POTASSIUM 50 MG/1
100 TABLET ORAL
Status: DISCONTINUED | OUTPATIENT
Start: 2019-03-06 | End: 2019-03-06 | Stop reason: HOSPADM

## 2019-03-06 RX ORDER — LEVOFLOXACIN 750 MG/1
750 TABLET ORAL DAILY
Qty: 6 TABLET | Refills: 0 | Status: SHIPPED | OUTPATIENT
Start: 2019-03-06 | End: 2019-03-12

## 2019-03-06 RX ORDER — HYDROCHLOROTHIAZIDE 12.5 MG/1
12.5 TABLET ORAL DAILY
Status: DISCONTINUED | OUTPATIENT
Start: 2019-03-06 | End: 2019-03-06 | Stop reason: HOSPADM

## 2019-03-06 RX ORDER — TRAMADOL HYDROCHLORIDE 50 MG/1
50 TABLET ORAL EVERY 6 HOURS PRN
Qty: 20 TABLET | Refills: 0 | Status: SHIPPED | OUTPATIENT
Start: 2019-03-06 | End: 2019-03-11

## 2019-03-06 RX ORDER — DOCOSANOL 100 MG/G
CREAM TOPICAL
Qty: 2 G | Refills: 0 | Status: SHIPPED | OUTPATIENT
Start: 2019-03-06 | End: 2021-06-05

## 2019-03-06 RX ORDER — CLINDAMYCIN HYDROCHLORIDE 300 MG/1
600 CAPSULE ORAL 3 TIMES DAILY
Qty: 36 CAPSULE | Refills: 0 | Status: SHIPPED | OUTPATIENT
Start: 2019-03-06 | End: 2019-03-06 | Stop reason: SDUPTHER

## 2019-03-06 RX ORDER — ONDANSETRON 4 MG/1
4 TABLET, FILM COATED ORAL EVERY 6 HOURS PRN
Qty: 30 TABLET | Refills: 0 | Status: SHIPPED | OUTPATIENT
Start: 2019-03-06 | End: 2021-06-05 | Stop reason: SDUPTHER

## 2019-03-06 RX ORDER — TAMOXIFEN CITRATE 10 MG/1
20 TABLET ORAL DAILY
Status: DISCONTINUED | OUTPATIENT
Start: 2019-03-06 | End: 2019-03-06 | Stop reason: HOSPADM

## 2019-03-06 RX ORDER — LANOLIN ALCOHOL/MO/W.PET/CERES
1000 CREAM (GRAM) TOPICAL DAILY
Qty: 30 TABLET | Refills: 1 | Status: SHIPPED | OUTPATIENT
Start: 2019-03-06 | End: 2021-06-05

## 2019-03-06 RX ADMIN — CLINDAMYCIN IN 5 PERCENT DEXTROSE 900 MG: 18 INJECTION, SOLUTION INTRAVENOUS at 01:32

## 2019-03-06 RX ADMIN — DOCOSANOL: 100 CREAM TOPICAL at 11:06

## 2019-03-06 RX ADMIN — LEVOTHYROXINE SODIUM 50 MCG: 50 TABLET ORAL at 06:49

## 2019-03-06 RX ADMIN — TRAMADOL HYDROCHLORIDE 50 MG: 50 TABLET, FILM COATED ORAL at 06:50

## 2019-03-06 RX ADMIN — FAMOTIDINE 20 MG: 20 TABLET, FILM COATED ORAL at 10:26

## 2019-03-06 RX ADMIN — SODIUM CHLORIDE, PRESERVATIVE FREE 3 ML: 5 INJECTION INTRAVENOUS at 11:04

## 2019-03-06 RX ADMIN — MEROPENEM 500 MG: 500 INJECTION, POWDER, FOR SOLUTION INTRAVENOUS at 11:06

## 2019-03-06 RX ADMIN — Medication 1 CAPSULE: at 10:26

## 2019-03-06 RX ADMIN — HYDROCHLOROTHIAZIDE 12.5 MG: 12.5 TABLET ORAL at 13:09

## 2019-03-06 RX ADMIN — CLINDAMYCIN IN 5 PERCENT DEXTROSE 900 MG: 18 INJECTION, SOLUTION INTRAVENOUS at 10:19

## 2019-03-06 RX ADMIN — DOCOSANOL: 100 CREAM TOPICAL at 06:49

## 2019-03-06 RX ADMIN — POLYVINYL ALCOHOL 1 DROP: 14 SOLUTION/ DROPS OPHTHALMIC at 10:26

## 2019-03-06 RX ADMIN — ONDANSETRON 4 MG: 4 TABLET, FILM COATED ORAL at 04:30

## 2019-03-06 RX ADMIN — ONDANSETRON 4 MG: 4 TABLET, FILM COATED ORAL at 10:26

## 2019-03-06 RX ADMIN — TAMOXIFEN CITRATE 20 MG: 10 TABLET ORAL at 11:08

## 2019-03-06 RX ADMIN — LOSARTAN POTASSIUM 100 MG: 50 TABLET ORAL at 13:10

## 2019-03-06 RX ADMIN — ENOXAPARIN SODIUM 40 MG: 100 INJECTION SUBCUTANEOUS at 10:26

## 2019-03-06 NOTE — DISCHARGE INSTR - APPOINTMENTS
Spoke with Sunita at Dr Lipscomb's office to set up a 1 week follow up appointment.  The only appointment they had available was March 25th at 2:30.

## 2019-03-06 NOTE — NURSING NOTE
"Continued Stay Note  JOSE G Dean     Patient Name: Shyanne Dan  MRN: [id number]  Today's Date: 3/6/2019    Admit Date: 2/28/2019    Discharge Plan     Row Name 03/06/19 1427       Plan    Plan Comments  Spoke with Mrs Dan and informed her that a walker costs about $95.  She states \"I'll just use my husbands.\"        Discharge Codes    No documentation.       Expected Discharge Date and Time     Expected Discharge Date Expected Discharge Time    Mar 6, 2019             Sary Stevens RN    "

## 2019-03-06 NOTE — DISCHARGE SUMMARY
"Shyanne Dan  7/29/1930  [id number]    Hospitalists Discharge Summary    Date of Admission: 2/28/2019  Date of Discharge:  3/6/2019    History of Present Illness: Taken from [facility] on admit:  \"This patient came to [place] ER with sudden onset of abdominal pain at home and it gradually worsened as the day went on.  She had spent several hours in the emergency room at [place] and they felt that they would get a room because their beds were full but she said they could not find her room so they called [facility] for transfer when they realized they could not get her a bed.  Her abdominal pain is mildly improved with the antibiotics that were given.  She is allergic to penicillin.  The CT scan of her abdomen is negative chest x-ray is negative urinalysis was negative but her white blood cell count is elevated.  Lactic acid level is elevated she was febrile and tachycardic.  Patient appears to have sepsis from an unknown organism and sepsis protocol was ordered with aztreonam and vancomycin.\"    Hospital Course Summary/Primary Discharge Diagnoses:  Sepsis secondary to acute diverticulitis: Surgery/GI following  Initially on Merrem and Flagyl however secondary to excessive nausea with Flagyl this was discontinued on 3/5/2019 and changed to clindamycin  Home today on clindamycin/levaquin/probiotic x 6 more days for a total of 10 days  Nausea controlled with d/c flagyl and scheduled zofran  Advanced to low fiber/low residue CC diet  Dietician counseled for diverticular diet  F/U Dr. Garcia 1 week  F/U Dr. Wiley 4 weeks  F/U Derrick Lipscomb MD 1 week  Secondary Discharge Diagnoses:  Bilateral lower extremity edema: secondary to previous IVFs likely, Venous duplex negative for DVTs  Encourage ambulation, elevation  Resume home hydrochlorothiazide  Follow-up Derrick Lipscomb MD      Fever blister: added docosanol, continue     DM 2 in obese: A1C 5.5%  Held home januvia throughout  Resume " today with diet resumed fully     Hypothyroidism: continued home daily dose of levothyroxine 50 mcg     Hypertension: Blood pressure elevated today off home medication, resume home losartan and hydrochlorothiazide     GERD: No current acute issues, continued Pepcid daily     Normocytic anemia: Hemoglobin stable at 10.9, no active blood loss noted     CKD 3: Baseline creatinine approximately 1.1-1.2, currently 1.04  No acute issues     OA: No current acute issues on tramadol as needed     Asthma: No acute issues on home Singulair, patient refused Symbicort      Seizure disorder: No acute issues here     B12 deficiency: injection given here 3/3/19, will need   Plan f/u Derrick Lipscomb MD to arrange     H/O Breast Cancer and Mastectomy: Resumed home tamoxifen today    PCP  Patient Care Team:  Derrick Lipscomb MD as PCP - General  Derrick Lipscomb MD as PCP - Family Medicine    Consults:   Consults     Date and Time Order Name Status Description    3/4/2019 0906 Inpatient Gastroenterology Consult Completed     3/1/2019 0334 Inpatient General Surgery Consult Completed         Operations and Procedures Performed:     Xr Abdomen Flat & Upright    Result Date: 3/1/2019  Narrative: ABDOMEN 3 VIEWS 3/1/2019  HISTORY: Generalized abdominal pain for 2 days, worsening left lower quadrant with associated diarrhea.  FINDINGS: 3 views of the abdomen demonstrate mild gaseous distention of the small bowel with a normal colonic gas pattern. There is some residual radiographic contrast seen within the colon. Fecal matter is seen in colon. Findings most likely reflect ileus or enteritis. There are no abnormal abdominal calcifications. The bony structures are normal.      Impression: Mild gaseous distention of the small bowel with normal colonic gas pattern and large amount of fecal matter in the colon. Findings may reflect ileus or enteritis. No evidence of free air.  This report was finalized on 3/1/2019 4:07 PM by   Kirill Loaiza MD.      Ct Abdomen Pelvis With Contrast    Result Date: 3/3/2019  Narrative: CT ABDOMEN AND PELVIS WITH IV AND ORAL CONTRAST 03/02/2019  HISTORY: 88-year-old female with abdominal pain since 02/28/2019.  COMPARISON: CT abdomen and pelvis 02/28/2019 from [facility]  TECHNIQUE: Axial images performed through the abdomen and pelvis following IV and oral contrast. Multiplanar reconstructed images reviewed. Radiation dose reduction techniques included automated exposure control or exposure modulation based on body size. Radiation audit for CT and nuclear cardiology exams in the last 12 months: 2.  ABDOMEN FINDINGS: Small left basilar atelectasis. No effusions. Liver and spleen unremarkable. Gallbladder distended but no wall thickening or fluid. 1.6 cm cystic lesion off the anterior aspect of the pancreas near the junction of the body and tail. This does not enhance. This may represent a pseudocyst or benign cystic neoplasm. Unchanged from prior CT. The adrenal glands unremarkable. Large right renal peripelvic cyst measuring up to 6.4 cm. Several small bilateral cortical cysts. Focal bowel wall thickening involving the proximal small bowel within the left mid abdomen with adjacent inflammatory change within the mesentery. Several contained gas collections adjacent to the site of inflammation within the bowel but no drainable abscess. The region of inflammation measures about 5 x 7 centimeters in greatest transverse dimensions with increasing inflammation when comparing patient's outside CT. No evidence of obstruction. Though there is focal bowel wall thickening no discrete mass.Several focal outpouchings in the region suggest the possibility of small bowel diverticula. Colonic diverticulosis.  Pelvis: Uterus surgically absent. Bladder unremarkable. Osseous structures and soft tissues appear normal.      Impression: Focal bowel wall thickening involving the small bowel in the left mid abdomen  with surrounding inflammatory changes in the mesentery and a moderate amount of contained extraluminal gas. There are several diverticula in the region and this may represent an episode of acute small bowel diverticulitis. Though this is rare it is a known complication of small bowel diverticulosis. No evidence of abscess or drainable fluid collection. No obstruction. This is felt to be clearly distinct from the left colon which is a more typical location for diverticulitis. Clinical and imaging follow-up to resolution recommended to exclude underlying mass.  These findings were called and discussed with Dr. Jenkins on call hospitalist at the time of this dictation.  This report was finalized on 3/3/2019 9:50 AM by Dr. LACEY Khan MD.      Us Venous Doppler Lower Extremity Bilateral (duplex)    Result Date: 3/4/2019  Narrative: VENOUS DOPPLER ULTRASOUND, BILATERAL LOWER EXTREMITIES, 3/4/2019  HISTORY: Bilateral lower extremity edema, swelling for one day. Evaluate for deep vein thrombosis.  TECHNIQUE: Venous Doppler ultrasound examination of both legs was performed using grey-scale, spectral Doppler, and color flow Doppler ultrasound imaging.  FINDINGS: The examination is negative.  There is no evidence of deep venous thrombosis from the groin to the lower calf bilaterally. The greater saphenous veins are also patent.      Impression: Negative examination.  No evidence of lower extremity DVT on the right or left.  This report was finalized on 3/4/2019 3:37 PM by Dr. Kirill Loaiza MD.      Allergies:  is allergic to penicillins and phenobarbital.    Isaac  reviewed, no medications noted per report of 2/28/2019    Discharge Medications:     Discharge Medications      New Medications      Instructions Start Date   clindamycin 300 MG capsule  Commonly known as:  CLEOCIN   900 mg, Oral, 3 Times Daily      docosanol 10 % cream cream  Commonly known as:  ABREVA   Topical, 5 Times Daily      lactobacillus  acidophilus capsule capsule   1 capsule, Oral, Daily      levoFLOXacin 750 MG tablet  Commonly known as:  LEVAQUIN   750 mg, Oral, Daily      ondansetron 4 MG tablet  Commonly known as:  ZOFRAN   4 mg, Oral, Every 6 Hours PRN      traMADol 50 MG tablet  Commonly known as:  ULTRAM   50 mg, Oral, Every 6 Hours PRN      vitamin B-12 1000 MCG tablet  Commonly known as:  CYANOCOBALAMIN   1,000 mcg, Oral, Daily         Continue These Medications      Instructions Start Date   acetaminophen 325 MG tablet  Commonly known as:  TYLENOL   Oral      albuterol sulfate  (90 Base) MCG/ACT inhaler  Commonly known as:  PROVENTIL HFA;VENTOLIN HFA;PROAIR HFA   2 puffs, Every 4 Hours PRN, Albuterol Sulfate  MCG/ACT AERS; Patient Sig: Albuterol Sulfate  MCG/ACT AERS ; 0; 04-Mar-2015; Active      ASPIR-81 PO   Oral      budesonide-formoterol 80-4.5 MCG/ACT inhaler  Commonly known as:  SYMBICORT   2 puffs, As Needed, Symbicort 80-4.5 MCG/ACT Inhalation Aerosol; Patient Sig: Symbicort 80-4.5 MCG/ACT Inhalation Aerosol ; 0; 23-Oct-2014; Active      CLARITIN 10 MG tablet  Generic drug:  loratadine   10 mg, Oral, Daily      clobetasol 0.05 % cream  Commonly known as:  TEMOVATE   1 application, Topical, As Needed      hydrochlorothiazide 12.5 MG tablet  Commonly known as:  HYDRODIURIL   12.5 mg, Oral, Daily      levothyroxine 50 MCG tablet  Commonly known as:  SYNTHROID, LEVOTHROID   50 mcg, Oral, Daily      losartan 100 MG tablet  Commonly known as:  COZAAR   50 mg, Oral, Daily      montelukast 10 MG tablet  Commonly known as:  SINGULAIR   Oral      raNITIdine 150 MG tablet  Commonly known as:  ZANTAC   150 mg, Oral, 2 Times Daily      RESTASIS OP   2 drops, Ophthalmic, As Needed      SITagliptin 100 MG tablet  Commonly known as:  JANUVIA   100 mg, Oral, Daily      tacrolimus 0.1 % ointment  Commonly known as:  PROTOPIC   Topical, As Needed      tamoxifen 20 MG chemo tablet  Commonly known as:  NOLVADEX   20 mg, Oral,  Daily         Stop These Medications    ibuprofen 200 MG tablet  Commonly known as:  ADVIL,MOTRIN     nabumetone 750 MG tablet  Commonly known as:  RELAFEN            Last Lab Results:   Lab Results (most recent)     Procedure Component Value Units Date/Time    POC Glucose Once [[id number]]  (Normal) Collected:  03/06/19 0719    Specimen:  Blood Updated:  03/06/19 0739     Glucose 103 mg/dL     Basic Metabolic Panel [[id number]]  (Abnormal) Collected:  03/06/19 0409    Specimen:  Blood Updated:  03/06/19 0537     Glucose 102 mg/dL      BUN 13 mg/dL      Creatinine 1.04 mg/dL      Sodium 142 mmol/L      Potassium 3.5 mmol/L      Chloride 107 mmol/L      CO2 25.7 mmol/L      Calcium 8.3 mg/dL      eGFR Non African Amer 50 mL/min/1.73      BUN/Creatinine Ratio 12.5     Anion Gap 9.3 mmol/L     Narrative:       The MDRD GFR formula is only valid for adults with stable renal function between ages 18 and 70.    CBC & Differential [[id number]] Collected:  03/06/19 0409    Specimen:  Blood Updated:  03/06/19 0506    Narrative:       The following orders were created for panel order CBC & Differential.  Procedure                               Abnormality         Status                     ---------                               -----------         ------                     CBC Auto Differential[[id number]]        Abnormal            Final result                 Please view results for these tests on the individual orders.    CBC Auto Differential [[id number]]  (Abnormal) Collected:  03/06/19 0409    Specimen:  Blood Updated:  03/06/19 0506     WBC 5.85 10*3/mm3      RBC 3.59 10*6/mm3      Hemoglobin 10.9 g/dL      Hematocrit 33.3 %      MCV 92.8 fL      MCH 30.4 pg      MCHC 32.7 g/dL      RDW 12.4 %      RDW-SD 42.2 fl      MPV 9.9 fL      Platelets 239 10*3/mm3      Neutrophil % 62.0 %      Lymphocyte % 20.3 %      Monocyte % 13.0 %      Eosinophil % 3.9 %      Basophil % 0.5 %      Immature Grans % 0.3 %      Neutrophils,  Absolute 3.62 10*3/mm3      Lymphocytes, Absolute 1.19 10*3/mm3      Monocytes, Absolute 0.76 10*3/mm3      Eosinophils, Absolute 0.23 10*3/mm3      Basophils, Absolute 0.03 10*3/mm3      Immature Grans, Absolute 0.02 10*3/mm3      nRBC 0.0 /100 WBC     Blood Culture - Blood, Hand, Right [[id number]] Collected:  03/01/19 0210    Specimen:  Blood from Hand, Right Updated:  03/06/19 0247     Blood Culture No growth at 5 days    Blood Culture - Blood, Hand, Right [[id number]] Collected:  03/01/19 0235    Specimen:  Blood from Hand, Right Updated:  03/06/19 0247     Blood Culture No growth at 5 days    POC Glucose Once [[id number]]  (Abnormal) Collected:  03/05/19 2041    Specimen:  Blood Updated:  03/05/19 2048     Glucose 137 mg/dL     CBC & Differential [[id number]] Collected:  03/05/19 0400    Specimen:  Blood Updated:  03/05/19 0504    Narrative:       The following orders were created for panel order CBC & Differential.  Procedure                               Abnormality         Status                     ---------                               -----------         ------                     CBC Auto Differential[[id number]]        Abnormal            Final result                 Please view results for these tests on the individual orders.    CBC Auto Differential [[id number]]  (Abnormal) Collected:  03/05/19 0400    Specimen:  Blood Updated:  03/05/19 0504     WBC 6.29 10*3/mm3      RBC 3.59 10*6/mm3      Hemoglobin 11.0 g/dL      Hematocrit 34.0 %      MCV 94.7 fL      MCH 30.6 pg      MCHC 32.4 g/dL      RDW 12.3 %      RDW-SD 43.0 fl      MPV 10.2 fL      Platelets 229 10*3/mm3      Neutrophil % 67.8 %      Lymphocyte % 17.3 %      Monocyte % 11.0 %      Eosinophil % 3.3 %      Basophil % 0.3 %      Immature Grans % 0.3 %      Neutrophils, Absolute 4.26 10*3/mm3      Lymphocytes, Absolute 1.09 10*3/mm3      Monocytes, Absolute 0.69 10*3/mm3      Eosinophils, Absolute 0.21 10*3/mm3      Basophils, Absolute 0.02  10*3/mm3      Immature Grans, Absolute 0.02 10*3/mm3      nRBC 0.0 /100 WBC     Basic Metabolic Panel [[id number]]  (Abnormal) Collected:  03/04/19 0406    Specimen:  Blood Updated:  03/04/19 0532     Glucose 74 mg/dL      BUN 18 mg/dL      Creatinine 1.06 mg/dL      Sodium 141 mmol/L      Potassium 3.9 mmol/L      Chloride 106 mmol/L      CO2 22.8 mmol/L      Calcium 7.8 mg/dL      eGFR Non African Amer 49 mL/min/1.73      BUN/Creatinine Ratio 17.0     Anion Gap 12.2 mmol/L     Narrative:       The MDRD GFR formula is only valid for adults with stable renal function between ages 18 and 70.    Sedimentation Rate [[id number]]  (Abnormal) Collected:  03/03/19 0527    Specimen:  Blood Updated:  03/03/19 0736     Sed Rate 37 mm/hr     Vitamin B12 [[id number]]  (Abnormal) Collected:  03/03/19 0443    Specimen:  Blood Updated:  03/03/19 0653     Vitamin B-12 <150 pg/mL     C-reactive Protein [[id number]]  (Abnormal) Collected:  03/03/19 0443    Specimen:  Blood Updated:  03/03/19 0602     C-Reactive Protein 5.60 mg/dL     Comprehensive Metabolic Panel [[id number]]  (Abnormal) Collected:  03/03/19 0443    Specimen:  Blood Updated:  03/03/19 0558     Glucose 81 mg/dL      BUN 18 mg/dL      Creatinine 1.20 mg/dL      Sodium 139 mmol/L      Potassium 4.0 mmol/L      Chloride 106 mmol/L      CO2 21.3 mmol/L      Calcium 8.3 mg/dL      Total Protein 6.1 g/dL      Albumin 3.20 g/dL      ALT (SGPT) 9 U/L      AST (SGOT) 12 U/L      Alkaline Phosphatase 44 U/L      Total Bilirubin 0.6 mg/dL      eGFR Non African Amer 42 mL/min/1.73      Globulin 2.9 gm/dL      A/G Ratio 1.1 g/dL      BUN/Creatinine Ratio 15.0     Anion Gap 11.7 mmol/L     Narrative:       The MDRD GFR formula is only valid for adults with stable renal function between ages 18 and 70.    Lipase [[id number]]  (Abnormal) Collected:  03/03/19 0443    Specimen:  Blood Updated:  03/03/19 0558     Lipase 11 U/L     Amylase [[id number]]  (Abnormal) Collected:  03/03/19 0447     Specimen:  Blood Updated:  03/03/19 0558     Amylase 12 U/L     Phosphorus [[id number]]  (Normal) Collected:  03/03/19 0443    Specimen:  Blood Updated:  03/03/19 0558     Phosphorus 2.8 mg/dL     Magnesium [[id number]]  (Normal) Collected:  03/03/19 0443    Specimen:  Blood Updated:  03/03/19 0558     Magnesium 2.0 mg/dL     Urinalysis With Culture If Indicated - Urine, Clean Catch [[id number]]  (Normal) Collected:  03/02/19 1755    Specimen:  Urine, Clean Catch Updated:  03/02/19 1758     Color, UA Yellow     Appearance, UA Clear     pH, UA <=5.0     Specific Gravity, UA <=1.005     Glucose, UA Negative     Ketones, UA Negative     Bilirubin, UA Negative     Blood, UA Negative     Protein, UA Negative     Leuk Esterase, UA Negative     Nitrite, UA Negative     Urobilinogen, UA 0.2 E.U./dL    Narrative:       Urine microscopic not indicated.    Procalcitonin [[id number]]  (Abnormal) Collected:  03/01/19 0808    Specimen:  Blood Updated:  03/01/19 0859     Procalcitonin 2.26 ng/mL     Narrative:       As a Marker for Sepsis (Non-Neonates):   1. <0.5 ng/mL represents a low risk of severe sepsis and/or septic shock.  2. >2 ng/mL represents a high risk of severe sepsis and/or septic shock.    As a Marker for Lower Respiratory Tract Infections that require antibiotic therapy:    PCT on Admission     Antibiotic Therapy       6-12 Hrs later  > 0.5                Strongly Recommended             >0.25 - <0.5         Recommended  0.1 - 0.25           Discouraged              Remeasure/reassess PCT  <0.1                 Strongly Discouraged     Remeasure/reassess PCT                     PCT values of < 0.5 ng/mL do not exclude an infection, because localized infections (without systemic signs) may be associated with such low concentrations, or a systemic infection in its initial stages (< 6 hours). Furthermore, increased PCT can occur without infection. PCT concentrations between 0.5 and 2.0 ng/mL should be interpreted taking  into account the patient's history. It is recommended to retest PCT within 6-24 hours if any concentrations < 2 ng/mL are obtained.    Troponin [[id number]]  (Normal) Collected:  03/01/19 0808    Specimen:  Blood Updated:  03/01/19 0856     Troponin T <0.010 ng/mL     Narrative:       Troponin T Reference Range:  <= 0.03 ng/mL-   Negative for AMI  >0.03 ng/mL-     Abnormal for myocardial necrosis.  Clinicians would have to utilize clinical acumen, EKG, Troponin and serial changes to determine if it is an Acute Myocardial Infarction or myocardial injury due to an underlying chronic condition.     Respiratory Panel, PCR - Swab, Nasopharynx [[id number]]  (Normal) Collected:  03/01/19 0327    Specimen:  Swab from Nasopharynx Updated:  03/01/19 0856     ADENOVIRUS, PCR Not Detected     Coronavirus 229E Not Detected     Coronavirus HKU1 Not Detected     Coronavirus NL63 Not Detected     Coronavirus OC43 Not Detected     Human Metapneumovirus Not Detected     Human Rhinovirus/Enterovirus Not Detected     Influenza B PCR Not Detected     Parainfluenza Virus 1 Not Detected     Parainfluenza Virus 2 Not Detected     Parainfluenza Virus 3 Not Detected     Parainfluenza Virus 4 Not Detected     Bordetella pertussis pcr Not Detected     Influenza A H1 2009 PCR Not Detected     Chlamydophila pneumoniae PCR Not Detected     Mycoplasma pneumo by PCR Not Detected     Influenza A PCR Not Detected     Influenza A H3 Not Detected     Influenza A H1 Not Detected     RSV, PCR Not Detected     Bordetella parapertussis PCR Not Detected    Magnesium [[id number]]  (Normal) Collected:  03/01/19 0808    Specimen:  Blood Updated:  03/01/19 0847     Magnesium 2.0 mg/dL     Phosphorus [[id number]]  (Normal) Collected:  03/01/19 0808    Specimen:  Blood Updated:  03/01/19 0847     Phosphorus 2.9 mg/dL     Troponin [[id number]]  (Normal) Collected:  03/01/19 0210    Specimen:  Blood Updated:  03/01/19 0310     Troponin T <0.010 ng/mL     Narrative:        Troponin T Reference Range:  <= 0.03 ng/mL-   Negative for AMI  >0.03 ng/mL-     Abnormal for myocardial necrosis.  Clinicians would have to utilize clinical acumen, EKG, Troponin and serial changes to determine if it is an Acute Myocardial Infarction or myocardial injury due to an underlying chronic condition.     Comprehensive Metabolic Panel [[id number]]  (Abnormal) Collected:  03/01/19 0210    Specimen:  Blood Updated:  03/01/19 0246     Glucose 143 mg/dL      BUN 21 mg/dL      Creatinine 1.28 mg/dL      Sodium 140 mmol/L      Potassium 4.2 mmol/L      Chloride 106 mmol/L      CO2 24.5 mmol/L      Calcium 8.0 mg/dL      Total Protein 6.1 g/dL      Albumin 3.40 g/dL      ALT (SGPT) 12 U/L      AST (SGOT) 15 U/L      Alkaline Phosphatase 53 U/L      Total Bilirubin 0.5 mg/dL      eGFR Non African Amer 39 mL/min/1.73      Globulin 2.7 gm/dL      A/G Ratio 1.3 g/dL      BUN/Creatinine Ratio 16.4     Anion Gap 9.5 mmol/L     Narrative:       The MDRD GFR formula is only valid for adults with stable renal function between ages 18 and 70.    Hemoglobin A1c [[id number]]  (Normal) Collected:  03/01/19 0210    Specimen:  Blood Updated:  03/01/19 0243     Hemoglobin A1C 5.50 %     Narrative:       Hemoglobin A1C Ranges:    Increased Risk for Diabetes  5.7% to 6.4%  Diabetes                     >= 6.5%  Diabetic Goal                < 7.0%    Lactic Acid, Plasma [[id number]]  (Normal) Collected:  03/01/19 0210    Specimen:  Blood Updated:  03/01/19 0243     Lactate 1.5 mmol/L         Imaging Results (most recent)     Procedure Component Value Units Date/Time    US Venous Doppler Lower Extremity Bilateral (duplex) [[id number]] Collected:  03/04/19 1536     Updated:  03/04/19 1539    Narrative:       VENOUS DOPPLER ULTRASOUND, BILATERAL LOWER EXTREMITIES, 3/4/2019     HISTORY:   Bilateral lower extremity edema, swelling for one day. Evaluate for deep  vein thrombosis.     TECHNIQUE:   Venous Doppler ultrasound examination of  both legs was performed using  grey-scale, spectral Doppler, and color flow Doppler ultrasound imaging.     FINDINGS:   The examination is negative.  There is no evidence of deep venous  thrombosis from the groin to the lower calf bilaterally. The greater  saphenous veins are also patent.       Impression:       Negative examination.  No evidence of lower extremity DVT on the right  or left.     This report was finalized on 3/4/2019 3:37 PM by Dr. Kirill Loaiza MD.       CT Abdomen Pelvis With Contrast [[id number]] Collected:  03/03/19 0920     Updated:  03/03/19 0952    Narrative:       CT ABDOMEN AND PELVIS WITH IV AND ORAL CONTRAST 03/02/2019     HISTORY:  88-year-old female with abdominal pain since 02/28/2019.     COMPARISON: CT abdomen and pelvis 02/28/2019 from [facility]     TECHNIQUE:   Axial images performed through the abdomen and pelvis following IV and  oral contrast. Multiplanar reconstructed images reviewed. Radiation dose  reduction techniques included automated exposure control or exposure  modulation based on body size. Radiation audit for CT and nuclear  cardiology exams in the last 12 months: 2.     ABDOMEN FINDINGS:   Small left basilar atelectasis. No effusions. Liver and spleen  unremarkable. Gallbladder distended but no wall thickening or fluid. 1.6  cm cystic lesion off the anterior aspect of the pancreas near the  junction of the body and tail. This does not enhance. This may represent  a pseudocyst or benign cystic neoplasm. Unchanged from prior CT. The  adrenal glands unremarkable. Large right renal peripelvic cyst measuring  up to 6.4 cm. Several small bilateral cortical cysts. Focal bowel wall  thickening involving the proximal small bowel within the left mid  abdomen with adjacent inflammatory change within the mesentery. Several  contained gas collections adjacent to the site of inflammation within  the bowel but no drainable abscess. The region of inflammation  measures  about 5 x 7 centimeters in greatest transverse dimensions with  increasing inflammation when comparing patient's outside CT. No evidence  of obstruction. Though there is focal bowel wall thickening no discrete  mass.Several focal outpouchings in the region suggest the possibility of  small bowel diverticula. Colonic diverticulosis.     Pelvis: Uterus surgically absent. Bladder unremarkable. Osseous  structures and soft tissues appear normal.       Impression:       Focal bowel wall thickening involving the small bowel in  the left mid abdomen with surrounding inflammatory changes in the  mesentery and a moderate amount of contained extraluminal gas. There are  several diverticula in the region and this may represent an episode of  acute small bowel diverticulitis. Though this is rare it is a known  complication of small bowel diverticulosis. No evidence of abscess or  drainable fluid collection. No obstruction. This is felt to be clearly  distinct from the left colon which is a more typical location for  diverticulitis. Clinical and imaging follow-up to resolution recommended  to exclude underlying mass.     These findings were called and discussed with Dr. Jenkins on call  hospitalist at the time of this dictation.     This report was finalized on 3/3/2019 9:50 AM by Dr. LACEY Khan MD.       XR Abdomen Flat & Upright [[id number]] Collected:  03/01/19 1606     Updated:  03/01/19 1609    Narrative:       ABDOMEN 3 VIEWS 3/1/2019     HISTORY:   Generalized abdominal pain for 2 days, worsening left lower quadrant  with associated diarrhea.     FINDINGS:  3 views of the abdomen demonstrate mild gaseous distention of the small  bowel with a normal colonic gas pattern. There is some residual  radiographic contrast seen within the colon. Fecal matter is seen in  colon. Findings most likely reflect ileus or enteritis. There are no  abnormal abdominal calcifications. The bony structures are normal.        Impression:       Mild gaseous distention of the small bowel with normal  colonic gas pattern and large amount of fecal matter in the colon.  Findings may reflect ileus or enteritis. No evidence of free air.     This report was finalized on 3/1/2019 4:07 PM by Dr. Kirill Loaiza MD.           PROCEDURES: NONE    Condition on Discharge:  stable    Physical Exam at Discharge  Vital Signs  Temp:  [97.3 °F (36.3 °C)-98.2 °F (36.8 °C)] 98.2 °F (36.8 °C)  Heart Rate:  [75-80] 75  Resp:  [16-18] 16  BP: (143-168)/(65-74) 168/74  Body mass index is 30.3 kg/m².    Physical Exam:  Physical Exam   Constitutional: Patient appears well-developed and well-nourished and in no acute distress   HEENT:   Head: Normocephalic and atraumatic.   Eyes:  Pupils are equal, round, and reactive to light. EOM are intact. Sclera are anicteric and non-injected.  Mouth and Throat: Patient has moist mucous membranes. Oropharynx is clear of any erythema or exudate.     Neck: Neck supple. No JVD present. No thyromegaly present. No lymphadenopathy present.  Cardiovascular: Regular rate, regular rhythm, S1 normal and S2 normal.  Exam reveals no gallop and no friction rub.  No murmur heard.  Pulmonary/Chest: Lungs are clear to auscultation bilaterally. No respiratory distress. No wheezes. No rhonchi. No rales.   Abdominal: Soft. Bowel sounds are normal. No distension and no mass. There is no hepatosplenomegaly. There is no tenderness.   Musculoskeletal: Normal Muscle tone  Extremities: 1+ bilateral LE pitting edema. Pulses are palpable in all 4 extremities.  Neurological: Patient is alert and oriented to person, place, and time. Cranial nerves II-XII are grossly intact with no focal deficits.  Skin: Skin is warm. No rash noted. Nails show no clubbing.  No cyanosis or erythema.    Discharge Disposition  Home    Visiting Nurse:    Patient declined    Home PT/OT:  Patient declined    Home Safety Evaluation:  Patient declined    DME  None    Discharge Diet:       Dietary Orders (From admission, onward)    Start     Ordered    03/06/19 0803  Diet Regular; Consistent Carbohydrate, Low Fiber / Low Residue  Diet Effective Now     Question Answer Comment   Diet Texture / Consistency Regular    Common Modifiers Consistent Carbohydrate    Common Modifiers Low Fiber / Low Residue        03/06/19 0803          Activity at Discharge:  As tolerated    Pre-discharge education  Diabetic, medications, follow-up    Follow-up Appointments  No future appointments.  Additional Instructions for the Follow-ups that You Need to Schedule     Discharge Follow-up with PCP   As directed       Currently Documented PCP:    Derrick Lipscomb MD    PCP Phone Number:    [phone]     Follow Up Details:  1 week         Discharge Follow-up with Specialty: Dr. Garcia, surgery; 1 Week   As directed      Specialty:  Dr. Garcia, surgery    Follow Up:  1 Week         Discharge Follow-up with Specified Provider: Dr. Wiley; 1 Month   As directed      To:  Dr. Wiley    Follow Up:  1 Month             Test Results Pending at Discharge: None     PEPE Albarran  03/06/19  11:03 AM    Time: Discharge Over 30 min (if over 30 minutes give explanation as to why it took greater than 30 minutes)  Secondary to:   Coordination of care/follow up  Medication reconciliation  D/W patient and family

## 2019-03-06 NOTE — DISCHARGE INSTR - LAB
Spoke with Tanya at Dr. Garcia's office and  set up a 13 day follow up appointment on March 19th at 9:50

## 2019-03-06 NOTE — PROGRESS NOTES
I am following her up for her abdominal pain.  Her abdominal pain has resolved.  She feels well.  She tolerated solid food as a low fiber diet today.  She is afebrile vital signs are stable.  Her white blood count is normal.  Her abdominal exam is soft and nontender.  She can go home from my aspect on oral antibiotics to follow-up in my office in 13 days.  Oral antibiotics of Levaquin and clindamycin would be acceptable.

## 2019-03-06 NOTE — CONSULTS
Adult Nutrition  Assessment/PES    Patient Name:  Shyanne Dan  YOB: 1930  MRN: [id number]  Admit Date:  2/28/2019    Assessment Date:  3/6/2019    Comments:  Diet education provided (see below):    Reason for Assessment     Row Name 03/06/19 1710          Reason for Assessment    Reason For Assessment  physician consult     Identified At Risk by Screening Criteria  need for education         Problem/Interventions:    Problem 2     Row Name 03/06/19 1710          Nutrition Diagnoses Problem 2    Problem 2  Knowledge Deficit     Etiology (related to)  MNT for Treatment/Condition     Signs/Symptoms (evidenced by)  Potential Information Deficit                     Education/Evaluation     Row Name 03/06/19 1710          Education    Education  Provided education regarding Low Fiber diet with less than 13 grams per day, consistent carbohydrate     Provided education regarding  -- pt. demonstrated good understanding by ability to read a food label for fiber, able to state 2 foods to avoid ( whole wheat bread, corn), the need to avoid foods with added sugars         Monitor/Evaluation    Monitor  -- good compliance expected.     Education Follow-up  -- provided with handouts:  Food Labeling, Low Fiber Diet, Consistent Carbohydrate Diet and RD contact information           Electronically signed by:  Joanne Sexton RD  03/06/19 5:13 PM

## 2019-03-07 ENCOUNTER — READMISSION MANAGEMENT (OUTPATIENT)
Dept: CALL CENTER | Facility: HOSPITAL | Age: 84
End: 2019-03-07

## 2019-03-07 NOTE — OUTREACH NOTE
Prep Survey      Responses   Facility patient discharged from?  [facility]   Is LACE score < 7 ?  No   Is patient eligible?  Yes   Discharge diagnosis  Sepsis secondary to acute diverticulitis, Bilateral lower extremity edema   Does the patient have one of the following disease processes/diagnoses(primary or secondary)?  Sepsis   Does the patient have Home health ordered?  No   Is there a DME ordered?  No   Prep survey completed?  Yes          Naty Cardoso RN

## 2019-03-08 ENCOUNTER — READMISSION MANAGEMENT (OUTPATIENT)
Dept: CALL CENTER | Facility: HOSPITAL | Age: 84
End: 2019-03-08

## 2019-03-08 ENCOUNTER — TELEPHONE (OUTPATIENT)
Dept: SURGERY | Facility: CLINIC | Age: 84
End: 2019-03-08

## 2019-03-08 NOTE — TELEPHONE ENCOUNTER
Patient called stating antibiotic rx'd via discharge [facility] said take 3 capsules, 3 times a day. She is complaining of burning in throat.      Contacted Dr. Garcia to confirm dosage, Dr. Garcia said the dose should be 1 capsule, 3 times a day.    Patient was notified to only take 1 capsule, 3 times a day.

## 2019-03-08 NOTE — OUTREACH NOTE
Sepsis Week 1 Survey      Responses   Facility patient discharged from?  [facility]   Does the patient have one of the following disease processes/diagnoses(primary or secondary)?  Sepsis   Is there a successful TCM telephone encounter documented?  No   Week 1 attempt successful?  Yes   Call start time  1525   Call end time  1547   Discharge diagnosis  Sepsis secondary to acute diverticulitis, Bilateral lower extremity edema   Meds reviewed with patient/caregiver?  Yes   Is the patient having any side effects they believe may be caused by any medication additions or changes?  No   Does the patient have all medications related to this admission filled (includes all antibiotics, inhalers, nebulizers,steroids,etc.)  Yes   Is the patient taking all medications as directed (includes completed medication regime)?  Yes   Comments regarding appointments  March 19th has an appt    Does the patient have a primary care provider?   Yes   Comments regarding PCP  Dr Lipscomb / PCP- instructed to make appt with PCP   Does the patient have an appointment with their PCP within 7 days of discharge?  No   Has the patient kept scheduled appointments due by today?  Yes   Psychosocial issues?  No   Comments  Patient reports her legs are a little swollen today. I encouraged her to weight daily and measure her B/P daily. Instructed her to call her doctor if edema increases.    Did the patient receive a copy of their discharge instructions?  Yes   Nursing interventions  Reviewed instructions with patient   What is the patient's perception of their health status since discharge?  Improving   Nursing interventions  Nurse provided patient education   Is the patient/caregiver able to teach back Sepsis?  S - Shivering,fever or very cold, E - Extreme pain or generalized discomfort (worst ever,especially abdomen), P - Pale or discolored skin, S - Sleepy, difficult to arouse,confused, I -   I feel like I might die-a feeling of hopelessness, S - Short of  breath   Nursing interventions  Nurse provided patient education, Nurse provided reassurance to patient   Is patient/caregiver able to teach back steps to recovery at home?  Set small, achievable goals for return to baseline health, Rest and regain strength, Make a list of questions for PCP appoinment   Is the patient/caregiver able to teach back signs and symptoms of worsening condition:  Fever, Hyperthermia, Rapid heart rate (>90), Shortness of breath/rapid respiratory rate, Altered mental status(confusion/coma), Edema, High blood glucose without diabetes   Is the patient/caregiver able to teach back the hierarchy of who to call/visit for symptoms/problems? PCP, Specialist, Home health nurse, Urgent Care, ED, 911  Yes   Week 1 call completed?  Yes          Gavino Blanco RN

## 2019-03-15 ENCOUNTER — READMISSION MANAGEMENT (OUTPATIENT)
Dept: CALL CENTER | Facility: HOSPITAL | Age: 84
End: 2019-03-15

## 2019-03-15 NOTE — OUTREACH NOTE
Sepsis Week 2 Survey      Responses   Facility patient discharged from?  [facility]   Does the patient have one of the following disease processes/diagnoses(primary or secondary)?  Sepsis   Week 2 attempt successful?  Yes   Call start time  1611   Call end time  1618   Discharge diagnosis  Sepsis secondary to acute diverticulitis, Bilateral lower extremity edema   Medication alerts for this patient  taking new antibx. Taking clindamycin   Meds reviewed with patient/caregiver?  Yes   Is the patient taking all medications as directed (includes completed medication regime)?  Yes   Medication comments  quit clindamycin   Has the patient kept scheduled appointments due by today?  Yes   Comments  Has seen Dr Lipscomb   Psychosocial issues?  No   Comments  Pt elevating legs and I have instructed her to exercise her ankles and feet. She has lost her hearing aide and is having troube hearing.    What is the patient's perception of their health status since discharge?  Same   Nursing interventions  Nurse provided patient education   Is the patient/caregiver able to teach back Sepsis?  S - Shivering,fever or very cold, E - Extreme pain or generalized discomfort (worst ever,especially abdomen), P - Pale or discolored skin, S - Sleepy, difficult to arouse,confused, I -   I feel like I might die-a feeling of hopelessness, S - Short of breath   Nursing interventions  Nurse provided patient education, Nurse provided reassurance to patient   Is patient/caregiver able to teach back steps to recovery at home?  Set small, achievable goals for return to baseline health, Rest and regain strength, Make a list of questions for PCP appoinment   Is the patient/caregiver able to teach back signs and symptoms of worsening condition:  Fever, Hyperthermia, Rapid heart rate (>90), Shortness of breath/rapid respiratory rate, Altered mental status(confusion/coma), Edema, High blood glucose without diabetes   Is the patient/caregiver able to teach back the  "hierarchy of who to call/visit for symptoms/problems? PCP, Specialist, Home health nurse, Urgent Care, ED, 911  Yes   Additional teach back comments  Pt sattes \"I'm not feeling the greatest\". Her  is also sick. Enc her to take meds as ordered and f/u with MD   Week 2 call completed?  Yes          Gosia Patel RN  "

## 2019-03-19 ENCOUNTER — OFFICE VISIT (OUTPATIENT)
Dept: SURGERY | Facility: CLINIC | Age: 84
End: 2019-03-19

## 2019-03-19 VITALS
RESPIRATION RATE: 18 BRPM | HEIGHT: 65 IN | DIASTOLIC BLOOD PRESSURE: 68 MMHG | BODY MASS INDEX: 27.82 KG/M2 | WEIGHT: 167 LBS | SYSTOLIC BLOOD PRESSURE: 138 MMHG

## 2019-03-19 DIAGNOSIS — K57.92 DIVERTICULITIS: Primary | ICD-10-CM

## 2019-03-19 PROCEDURE — 99213 OFFICE O/P EST LOW 20 MIN: CPT | Performed by: SURGERY

## 2019-03-19 NOTE — PROGRESS NOTES
Subjective   Shyanne Dan is a 88 y.o. female here for   Chief Complaint   Patient presents with   • Abdominal Pain   abdominal pain is some better from last visit.     History of Present Illness she is here for follow-up on her small bowel diverticulitis.  She denies any vomiting she says she has occasional nausea.  She continues on antibiotics and a low fiber diet.  She had recent lab work at her primary care doctor's office.  She does complain of some lower extremity and hand swelling.    The following portions of the patient's history were reviewed and updated as appropriate: allergies, current medications, past family history, past medical history, past social history, past surgical history and problem list.    Past Medical History:   Diagnosis Date   • Arthritis    • Asthma    • Breathing problem    • Cancer (CMS/HCC)    • Constipation    • Diabetes (CMS/HCC)    • Fatigue    • GERD (gastroesophageal reflux disease)    • Headache    • History of allergy    • Hypertension    • Irregular bowel habits    • Lichen sclerosus    • Pregnancy, ectopic    • Seizures (CMS/HCC)    • Shortness of breath    • Thyroid trouble        Past Surgical History:   Procedure Laterality Date   • BREAST LUMPECTOMY     • CARDIAC CATHETERIZATION     • COLONOSCOPY     • HEMORRHOIDECTOMY     • HIP SURGERY  2011   • HYSTERECTOMY     • LYMPH NODE BIOPSY      excisional    • MASTECTOMY, PARTIAL Left    • OTHER SURGICAL HISTORY      Closed treatment acromioclavicular dislocation, manipulation   • TUBAL ABDOMINAL LIGATION      when she was 48       Family History   Problem Relation Age of Onset   • Emphysema Father    • Diabetes Other    • Hypertension Other    • Thyroid disease Other        Social History     Socioeconomic History   • Marital status:      Spouse name: Not on file   • Number of children: Not on file   • Years of education: Not on file   • Highest education level: Not on file   Social Needs   • Financial resource  strain: Not on file   • Food insecurity - worry: Not on file   • Food insecurity - inability: Not on file   • Transportation needs - medical: Not on file   • Transportation needs - non-medical: Not on file   Occupational History   • Not on file   Tobacco Use   • Smoking status: Never Smoker   • Smokeless tobacco: Never Used   Substance and Sexual Activity   • Alcohol use: No   • Drug use: Not on file   • Sexual activity: Not on file   Other Topics Concern   • Not on file   Social History Narrative   • Not on file       Current Outpatient Medications on File Prior to Visit   Medication Sig Dispense Refill   • acetaminophen (TYLENOL) 325 MG tablet Take  by mouth.     • albuterol (PROVENTIL HFA;VENTOLIN HFA) 108 (90 BASE) MCG/ACT inhaler 2 puffs Every 4 (Four) Hours As Needed. Albuterol Sulfate  MCG/ACT AERS; Patient Sig: Albuterol Sulfate  MCG/ACT AERS ; 0; 04-Mar-2015; Active     • Aspirin (ASPIR-81 PO) Take  by mouth.     • budesonide-formoterol (SYMBICORT) 80-4.5 MCG/ACT inhaler 2 puffs As Needed. Symbicort 80-4.5 MCG/ACT Inhalation Aerosol; Patient Sig: Symbicort 80-4.5 MCG/ACT Inhalation Aerosol ; 0; 23-Oct-2014; Active     • clindamycin (CLEOCIN) 300 MG capsule Take 3 capsules by mouth 3 (Three) Times a Day. 36 capsule 0   • clobetasol (TEMOVATE) 0.05 % cream Apply 1 application topically to the appropriate area as directed As Needed.     • CycloSPORINE (RESTASIS OP) Apply 2 drops to eye(s) as directed by provider As Needed.     • docosanol (ABREVA) 10 % cream cream Apply  topically to the appropriate area as directed 5 (Five) Times a Day. 2 g 0   • hydrochlorothiazide (HYDRODIURIL) 12.5 MG tablet Take 12.5 mg by mouth Daily.     • lactobacillus acidophilus (RISAQUAD) capsule capsule Take 1 capsule by mouth Daily. 30 capsule 1   • levothyroxine (SYNTHROID, LEVOTHROID) 50 MCG tablet Take 50 mcg by mouth Daily.     • loratadine (CLARITIN) 10 MG tablet Take 10 mg by mouth Daily.     • losartan  (COZAAR) 100 MG tablet Take 50 mg by mouth Daily.     • montelukast (SINGULAIR) 10 MG tablet Take  by mouth.     • ondansetron (ZOFRAN) 4 MG tablet Take 1 tablet by mouth Every 6 (Six) Hours As Needed for Nausea or Vomiting. 30 tablet 0   • ranitidine (ZANTAC) 150 MG tablet Take 150 mg by mouth 2 (Two) Times a Day.     • sitaGLIPtin (JANUVIA) 100 MG tablet Take 100 mg by mouth Daily.     • tacrolimus (PROTOPIC) 0.1 % ointment Apply  topically to the appropriate area as directed As Needed.     • tamoxifen (NOLVADEX) 20 MG chemo tablet      • vitamin B-12 (CYANOCOBALAMIN) 1000 MCG tablet Take 1 tablet by mouth Daily. 30 tablet 1     No current facility-administered medications on file prior to visit.          Review of Systems   Gastrointestinal: Positive for abdominal pain and nausea.         Objective   Physical Exam alert elderly white female in no active distress.  Her lungs are clear and equal her abdomen is soft with normal active bowel sounds and nontender.  Her lab values from the primary care showed potassium was mildly elevated at 5.4 creatinine was elevated 1.54 white blood count was normal her hemoglobin was normal.              Assessment/Plan   Small bowel diverticulitis  I think she can stop her antibiotics at this point and she can liberalize her diet.  I have encouraged her to improve her nutrition and increase her oral intake.  She should follow-up with her primary care doctor and I would like to see her back in 1 month

## 2019-03-22 ENCOUNTER — READMISSION MANAGEMENT (OUTPATIENT)
Dept: CALL CENTER | Facility: HOSPITAL | Age: 84
End: 2019-03-22

## 2019-03-22 NOTE — OUTREACH NOTE
Sepsis Week 3 Survey      Responses   Facility patient discharged from?  [facility]   Does the patient have one of the following disease processes/diagnoses(primary or secondary)?  Sepsis   Week 3 attempt successful?  Yes   Call start time  1220   Call end time  1225   Discharge diagnosis  Sepsis secondary to acute diverticulitis, Bilateral lower extremity edema   Medication alerts for this patient  Pt off antibiotics    Meds reviewed with patient/caregiver?  Yes   Is the patient having any side effects they believe may be caused by any medication additions or changes?  Yes   Side effects comments   pt has rash--did not have on Tuesday. She will ask her PCP about the rash   Is the patient taking all medications as directed (includes completed medication regime)?  Yes   Has the patient kept scheduled appointments due by today?  Yes   Psychosocial issues?  No   What is the patient's perception of their health status since discharge?  New symptoms unrelated to diagnosis   Nursing interventions  Nurse provided patient education   Is the patient/caregiver able to teach back Sepsis?  S - Shivering,fever or very cold, E - Extreme pain or generalized discomfort (worst ever,especially abdomen), P - Pale or discolored skin, S - Sleepy, difficult to arouse,confused, I -   I feel like I might die-a feeling of hopelessness, S - Short of breath   Is patient/caregiver able to teach back steps to recovery at home?  Set small, achievable goals for return to baseline health, Rest and regain strength, Make a list of questions for PCP appoinment   Is the patient/caregiver able to teach back signs and symptoms of worsening condition:  Fever, Hyperthermia, Rapid heart rate (>90), Shortness of breath/rapid respiratory rate, Altered mental status(confusion/coma), Edema, High blood glucose without diabetes   Additional teach back comments  Pt still weak. Enc good diet, fluids and f/u with PCP.   Week 3 call completed?  Yes          Gosia MCFARLANE  EZEQUIEL Patel

## 2019-03-29 ENCOUNTER — READMISSION MANAGEMENT (OUTPATIENT)
Dept: CALL CENTER | Facility: HOSPITAL | Age: 84
End: 2019-03-29

## 2019-03-29 NOTE — OUTREACH NOTE
"Sepsis Week 4 Survey      Responses   Facility patient discharged from?  [facility]   Does the patient have one of the following disease processes/diagnoses(primary or secondary)?  Sepsis   Week 4 attempt successful?  Yes   Call start time  0926   Call end time  0937   Discharge diagnosis  Sepsis secondary to acute diverticulitis, Bilateral lower extremity edema   Meds reviewed with patient/caregiver?  Yes   Is the patient taking all medications as directed (includes completed medication regime)?  Yes   Has the patient kept scheduled appointments due by today?  Yes   Is the patient still receiving Home Health Services?  N/A   What is the patient's perception of their health status since discharge?  New symptoms unrelated to diagnosis   Nursing interventions  Nurse provided patient education   Is the patient/caregiver able to teach back Sepsis?  S - Shivering,fever or very cold, P - Pale or discolored skin, E - Extreme pain or generalized discomfort (worst ever,especially abdomen), S - Short of breath   Nursing interventions  Nurse provided reassurance to patient   Is patient/caregiver able to teach back steps to recovery at home?  Rest and regain strength, Eat a balanced diet, Exercise as tolerated   Is the patient/caregiver able to teach back signs and symptoms of worsening condition:  Fever, Edema, Shortness of breath/rapid respiratory rate   Additional teach back comments  Pt says she has no abd pain . Eating reg diet. Pt says she is \"not as godd as I would like to be\". Feet/legs are sore and hurt all the time. Keeps her awake at night. Has itching rash on legs that is better, but not gone. SHe has trouble walking. Has \"a little breathing problem\" which she has had for years. 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 Pt says they are doing ok and she does not want  for her.   Week 4 call completed?  Yes   Would the patient like one additional call?  No   Graduated  Yes          Audra Cardona RN  "

## 2019-04-11 ENCOUNTER — OFFICE VISIT (OUTPATIENT)
Dept: GASTROENTEROLOGY | Facility: CLINIC | Age: 84
End: 2019-04-11

## 2019-04-11 VITALS
HEIGHT: 65 IN | BODY MASS INDEX: 28.36 KG/M2 | WEIGHT: 170.2 LBS | DIASTOLIC BLOOD PRESSURE: 72 MMHG | SYSTOLIC BLOOD PRESSURE: 142 MMHG

## 2019-04-11 DIAGNOSIS — K57.50 DIVERTICULOSIS OF BOTH SMALL AND LARGE INTESTINE WITHOUT BLEEDING: Primary | ICD-10-CM

## 2019-04-11 PROCEDURE — 99213 OFFICE O/P EST LOW 20 MIN: CPT | Performed by: INTERNAL MEDICINE

## 2019-04-11 NOTE — PROGRESS NOTES
PATIENT INFORMATION  Shyanne Dan       - 1930    CHIEF COMPLAINT  Chief Complaint   Patient presents with   • Follow-up     hospital follow up on diverticulitis       HISTORY OF PRESENT ILLNESS  Doing well off abx from hosp for Diverticulitis and no pain and is back on her miralax for her constipation and complaints mostly of her swelling in her legs.    Reviewed her scan and she had Ileal diverticulitis and also colon diverticulosis            REVIEW OF SYSTEMS  Review of Systems   Gastrointestinal: Positive for constipation.   All other systems reviewed and are negative.        ACTIVE PROBLEMS  Patient Active Problem List    Diagnosis   • Sepsis (CMS/HCC) [A41.9]   • Breast CA (CMS/HCC) [C50.919]   • Dizziness [R42]   • Cephalalgia [R51]   • Lichen sclerosus [L90.0]   • N&V (nausea and vomiting) [R11.2]   • Lump in thyroid [E07.9]         PAST MEDICAL HISTORY  Past Medical History:   Diagnosis Date   • Arthritis    • Asthma    • Breathing problem    • Cancer (CMS/HCC)    • Colon polyp    • Constipation    • Diabetes (CMS/HCC)    • Fatigue    • GERD (gastroesophageal reflux disease)    • Headache    • History of allergy    • Hypertension    • Irregular bowel habits    • Lichen sclerosus    • Pregnancy, ectopic    • Seizures (CMS/HCC)    • Shortness of breath    • Thyroid trouble          SURGICAL HISTORY  Past Surgical History:   Procedure Laterality Date   • BREAST LUMPECTOMY     • CARDIAC CATHETERIZATION     • COLONOSCOPY     • HEMORRHOIDECTOMY     • HIP SURGERY     • HYSTERECTOMY     • LYMPH NODE BIOPSY      excisional    • MASTECTOMY, PARTIAL Left    • OTHER SURGICAL HISTORY      Closed treatment acromioclavicular dislocation, manipulation   • TUBAL ABDOMINAL LIGATION      when she was 48         FAMILY HISTORY  Family History   Problem Relation Age of Onset   • Emphysema Father    • Diabetes Other    • Hypertension Other    • Thyroid disease Other    • Colon cancer Neg Hx    • Colon  polyps Neg Hx          SOCIAL HISTORY  Social History     Occupational History   • Not on file   Tobacco Use   • Smoking status: Never Smoker   • Smokeless tobacco: Never Used   Substance and Sexual Activity   • Alcohol use: No   • Drug use: Not on file   • Sexual activity: Not on file       Debilities/Disabilities Identified: None    Emotional Behavior: Appropriate    CURRENT MEDICATIONS    Current Outpatient Medications:   •  acetaminophen (TYLENOL) 325 MG tablet, Take  by mouth., Disp: , Rfl:   •  albuterol (PROVENTIL HFA;VENTOLIN HFA) 108 (90 BASE) MCG/ACT inhaler, 2 puffs Every 4 (Four) Hours As Needed. Albuterol Sulfate  MCG/ACT AERS; Patient Sig: Albuterol Sulfate  MCG/ACT AERS ; 0; 04-Mar-2015; Active, Disp: , Rfl:   •  Aspirin (ASPIR-81 PO), Take  by mouth., Disp: , Rfl:   •  budesonide-formoterol (SYMBICORT) 80-4.5 MCG/ACT inhaler, 2 puffs As Needed. Symbicort 80-4.5 MCG/ACT Inhalation Aerosol; Patient Sig: Symbicort 80-4.5 MCG/ACT Inhalation Aerosol ; 0; 23-Oct-2014; Active, Disp: , Rfl:   •  clindamycin (CLEOCIN) 300 MG capsule, Take 3 capsules by mouth 3 (Three) Times a Day., Disp: 36 capsule, Rfl: 0  •  clobetasol (TEMOVATE) 0.05 % cream, Apply 1 application topically to the appropriate area as directed As Needed., Disp: , Rfl:   •  CycloSPORINE (RESTASIS OP), Apply 2 drops to eye(s) as directed by provider As Needed., Disp: , Rfl:   •  docosanol (ABREVA) 10 % cream cream, Apply  topically to the appropriate area as directed 5 (Five) Times a Day., Disp: 2 g, Rfl: 0  •  hydrochlorothiazide (HYDRODIURIL) 12.5 MG tablet, Take 12.5 mg by mouth Daily., Disp: , Rfl:   •  lactobacillus acidophilus (RISAQUAD) capsule capsule, Take 1 capsule by mouth Daily., Disp: 30 capsule, Rfl: 1  •  levothyroxine (SYNTHROID, LEVOTHROID) 50 MCG tablet, Take 50 mcg by mouth Daily., Disp: , Rfl:   •  loratadine (CLARITIN) 10 MG tablet, Take 10 mg by mouth Daily., Disp: , Rfl:   •  losartan (COZAAR) 100 MG tablet,  "Take 50 mg by mouth Daily., Disp: , Rfl:   •  montelukast (SINGULAIR) 10 MG tablet, Take  by mouth., Disp: , Rfl:   •  ondansetron (ZOFRAN) 4 MG tablet, Take 1 tablet by mouth Every 6 (Six) Hours As Needed for Nausea or Vomiting., Disp: 30 tablet, Rfl: 0  •  ranitidine (ZANTAC) 150 MG tablet, Take 150 mg by mouth 2 (Two) Times a Day., Disp: , Rfl:   •  sitaGLIPtin (JANUVIA) 100 MG tablet, Take 100 mg by mouth Daily., Disp: , Rfl:   •  tacrolimus (PROTOPIC) 0.1 % ointment, Apply  topically to the appropriate area as directed As Needed., Disp: , Rfl:   •  tamoxifen (NOLVADEX) 20 MG chemo tablet, , Disp: , Rfl:   •  vitamin B-12 (CYANOCOBALAMIN) 1000 MCG tablet, Take 1 tablet by mouth Daily., Disp: 30 tablet, Rfl: 1    ALLERGIES  Penicillins and Phenobarbital    VITALS  Vitals:    04/11/19 1251   BP: 142/72   Weight: 77.2 kg (170 lb 3.2 oz)   Height: 165.1 cm (65\")       LAST RESULTS   Admission on 02/28/2019, Discharged on 03/06/2019   No results displayed because visit has over 200 results.        No results found.    PHYSICAL EXAM  Physical Exam   Constitutional: She is oriented to person, place, and time. She appears well-developed and well-nourished.   Eyes: Conjunctivae and EOM are normal. Pupils are equal, round, and reactive to light. No scleral icterus.   Neck: Normal range of motion. Neck supple. No thyromegaly present.   Cardiovascular: Normal rate, regular rhythm, normal heart sounds and intact distal pulses. Exam reveals no gallop.   No murmur heard.  Pulmonary/Chest: Effort normal and breath sounds normal. She has no wheezes. She has no rales.   Abdominal: Soft. Bowel sounds are normal. She exhibits no shifting dullness, no distension, no fluid wave, no abdominal bruit, no ascites and no mass. There is no hepatosplenomegaly. There is no tenderness. There is no guarding and negative Lisa's sign. Hernia confirmed negative in the ventral area.   Musculoskeletal: Normal range of motion. She exhibits no " edema.   Lymphadenopathy:     She has no cervical adenopathy.   Neurological: She is alert and oriented to person, place, and time.   Skin: Skin is warm and dry. No rash noted. She is not diaphoretic. No erythema.       ASSESSMENT  Diagnoses and all orders for this visit:    Diverticulosis of both small and large intestine without bleeding          PLAN  Will call for abx if needed  Return if symptoms worsen or fail to improve.

## 2019-08-15 ENCOUNTER — TELEPHONE (OUTPATIENT)
Dept: GASTROENTEROLOGY | Facility: CLINIC | Age: 84
End: 2019-08-15

## 2019-08-15 RX ORDER — SULFAMETHOXAZOLE AND TRIMETHOPRIM 800; 160 MG/1; MG/1
1 TABLET ORAL 2 TIMES DAILY
Qty: 28 TABLET | Refills: 0 | Status: SHIPPED | OUTPATIENT
Start: 2019-08-15 | End: 2019-08-29

## 2021-06-05 ENCOUNTER — HOSPITAL ENCOUNTER (EMERGENCY)
Facility: HOSPITAL | Age: 86
Discharge: HOME | End: 2021-06-05
Attending: EMERGENCY MEDICINE | Admitting: EMERGENCY MEDICINE

## 2021-06-05 ENCOUNTER — APPOINTMENT (OUTPATIENT)
Dept: CT IMAGING | Facility: HOSPITAL | Age: 86
End: 2021-06-05

## 2021-06-05 VITALS
SYSTOLIC BLOOD PRESSURE: 196 MMHG | HEIGHT: 66 IN | HEART RATE: 90 BPM | RESPIRATION RATE: 18 BRPM | DIASTOLIC BLOOD PRESSURE: 88 MMHG | WEIGHT: 163.8 LBS | BODY MASS INDEX: 26.33 KG/M2 | OXYGEN SATURATION: 94 %

## 2021-06-05 DIAGNOSIS — R10.84 GENERALIZED ABDOMINAL PAIN: Primary | ICD-10-CM

## 2021-06-05 DIAGNOSIS — D18.09 HEMANGIOMA OF SPINE: ICD-10-CM

## 2021-06-05 DIAGNOSIS — R91.8 LUNG NODULES: ICD-10-CM

## 2021-06-05 LAB
ALBUMIN SERPL-MCNC: 4.1 G/DL (ref 3.5–5.2)
ALBUMIN/GLOB SERPL: 1.6 G/DL
ALP SERPL-CCNC: 71 U/L (ref 39–117)
ALT SERPL W P-5'-P-CCNC: 8 U/L (ref 1–33)
ANION GAP SERPL CALCULATED.3IONS-SCNC: 11.2 MMOL/L (ref 5–15)
AST SERPL-CCNC: 15 U/L (ref 1–32)
BASOPHILS # BLD AUTO: 0.05 10*3/MM3 (ref 0–0.2)
BASOPHILS NFR BLD AUTO: 0.5 % (ref 0–1.5)
BILIRUB SERPL-MCNC: 0.7 MG/DL (ref 0–1.2)
BILIRUB UR QL STRIP: NEGATIVE
BUN SERPL-MCNC: 19 MG/DL (ref 8–23)
BUN/CREAT SERPL: 17 (ref 7–25)
CALCIUM SPEC-SCNC: 9.7 MG/DL (ref 8.2–9.6)
CHLORIDE SERPL-SCNC: 101 MMOL/L (ref 98–107)
CLARITY UR: CLEAR
CO2 SERPL-SCNC: 26.8 MMOL/L (ref 22–29)
COLOR UR: YELLOW
CREAT SERPL-MCNC: 1.12 MG/DL (ref 0.57–1)
DEPRECATED RDW RBC AUTO: 44.6 FL (ref 37–54)
EOSINOPHIL # BLD AUTO: 0.13 10*3/MM3 (ref 0–0.4)
EOSINOPHIL NFR BLD AUTO: 1.3 % (ref 0.3–6.2)
ERYTHROCYTE [DISTWIDTH] IN BLOOD BY AUTOMATED COUNT: 12.4 % (ref 12.3–15.4)
GFR SERPL CREATININE-BSD FRML MDRD: 46 ML/MIN/1.73
GLOBULIN UR ELPH-MCNC: 2.5 GM/DL
GLUCOSE SERPL-MCNC: 102 MG/DL (ref 65–99)
GLUCOSE UR STRIP-MCNC: NEGATIVE MG/DL
HCT VFR BLD AUTO: 42.7 % (ref 34–46.6)
HGB BLD-MCNC: 13.8 G/DL (ref 12–15.9)
HGB UR QL STRIP.AUTO: NEGATIVE
HOLD SPECIMEN: NORMAL
HOLD SPECIMEN: NORMAL
IMM GRANULOCYTES # BLD AUTO: 0.02 10*3/MM3 (ref 0–0.05)
IMM GRANULOCYTES NFR BLD AUTO: 0.2 % (ref 0–0.5)
KETONES UR QL STRIP: NEGATIVE
LEUKOCYTE ESTERASE UR QL STRIP.AUTO: NEGATIVE
LIPASE SERPL-CCNC: 14 U/L (ref 13–60)
LYMPHOCYTES # BLD AUTO: 2.23 10*3/MM3 (ref 0.7–3.1)
LYMPHOCYTES NFR BLD AUTO: 22.2 % (ref 19.6–45.3)
MCH RBC QN AUTO: 30.9 PG (ref 26.6–33)
MCHC RBC AUTO-ENTMCNC: 32.3 G/DL (ref 31.5–35.7)
MCV RBC AUTO: 95.7 FL (ref 79–97)
MONOCYTES # BLD AUTO: 0.76 10*3/MM3 (ref 0.1–0.9)
MONOCYTES NFR BLD AUTO: 7.6 % (ref 5–12)
NEUTROPHILS NFR BLD AUTO: 6.87 10*3/MM3 (ref 1.7–7)
NEUTROPHILS NFR BLD AUTO: 68.2 % (ref 42.7–76)
NITRITE UR QL STRIP: NEGATIVE
PH UR STRIP.AUTO: <=5 [PH] (ref 4.5–8)
PLATELET # BLD AUTO: 263 10*3/MM3 (ref 140–450)
PMV BLD AUTO: 10 FL (ref 6–12)
POTASSIUM SERPL-SCNC: 4.3 MMOL/L (ref 3.5–5.2)
PROT SERPL-MCNC: 6.6 G/DL (ref 6–8.5)
PROT UR QL STRIP: NEGATIVE
RBC # BLD AUTO: 4.46 10*6/MM3 (ref 3.77–5.28)
SODIUM SERPL-SCNC: 139 MMOL/L (ref 136–145)
SP GR UR STRIP: 1.02 (ref 1–1.03)
UROBILINOGEN UR QL STRIP: NORMAL
WBC # BLD AUTO: 10.06 10*3/MM3 (ref 3.4–10.8)
WHOLE BLOOD HOLD SPECIMEN: NORMAL
WHOLE BLOOD HOLD SPECIMEN: NORMAL

## 2021-06-05 PROCEDURE — 83690 ASSAY OF LIPASE: CPT | Performed by: EMERGENCY MEDICINE

## 2021-06-05 PROCEDURE — 25010000002 ONDANSETRON PER 1 MG: Performed by: EMERGENCY MEDICINE

## 2021-06-05 PROCEDURE — 80053 COMPREHEN METABOLIC PANEL: CPT | Performed by: EMERGENCY MEDICINE

## 2021-06-05 PROCEDURE — 99284 EMERGENCY DEPT VISIT MOD MDM: CPT | Performed by: EMERGENCY MEDICINE

## 2021-06-05 PROCEDURE — 96374 THER/PROPH/DIAG INJ IV PUSH: CPT

## 2021-06-05 PROCEDURE — 81003 URINALYSIS AUTO W/O SCOPE: CPT | Performed by: EMERGENCY MEDICINE

## 2021-06-05 PROCEDURE — 85025 COMPLETE CBC W/AUTO DIFF WBC: CPT | Performed by: EMERGENCY MEDICINE

## 2021-06-05 PROCEDURE — 0 DIATRIZOATE MEGLUMINE & SODIUM PER 1 ML: Performed by: EMERGENCY MEDICINE

## 2021-06-05 PROCEDURE — 99283 EMERGENCY DEPT VISIT LOW MDM: CPT

## 2021-06-05 PROCEDURE — 96361 HYDRATE IV INFUSION ADD-ON: CPT

## 2021-06-05 PROCEDURE — 74177 CT ABD & PELVIS W/CONTRAST: CPT

## 2021-06-05 RX ORDER — SODIUM CHLORIDE 0.9 % (FLUSH) 0.9 %
10 SYRINGE (ML) INJECTION AS NEEDED
Status: DISCONTINUED | OUTPATIENT
Start: 2021-06-05 | End: 2021-06-05 | Stop reason: HOSPADM

## 2021-06-05 RX ORDER — ONDANSETRON 2 MG/ML
4 INJECTION INTRAMUSCULAR; INTRAVENOUS ONCE
Status: COMPLETED | OUTPATIENT
Start: 2021-06-05 | End: 2021-06-05

## 2021-06-05 RX ORDER — DICYCLOMINE HCL 20 MG
20 TABLET ORAL EVERY 6 HOURS PRN
Qty: 20 TABLET | Refills: 0 | Status: SHIPPED | OUTPATIENT
Start: 2021-06-05

## 2021-06-05 RX ORDER — ONDANSETRON 4 MG/1
4 TABLET, FILM COATED ORAL EVERY 6 HOURS PRN
Qty: 20 TABLET | Refills: 0 | Status: SHIPPED | OUTPATIENT
Start: 2021-06-05

## 2021-06-05 RX ORDER — SODIUM CHLORIDE 9 MG/ML
125 INJECTION, SOLUTION INTRAVENOUS CONTINUOUS
Status: DISCONTINUED | OUTPATIENT
Start: 2021-06-05 | End: 2021-06-05 | Stop reason: HOSPADM

## 2021-06-05 RX ADMIN — DIATRIZOATE MEGLUMINE AND DIATRIZOATE SODIUM 30 ML: 600; 100 SOLUTION ORAL; RECTAL at 16:53

## 2021-06-05 RX ADMIN — ONDANSETRON 4 MG: 2 INJECTION INTRAMUSCULAR; INTRAVENOUS at 16:37

## 2021-06-05 RX ADMIN — SODIUM CHLORIDE 125 ML/HR: 9 INJECTION, SOLUTION INTRAVENOUS at 17:10

## 2021-06-05 NOTE — DISCHARGE INSTRUCTIONS
Medication as directed.  Plenty of fluids and rest.  Call Dr. Lipscomb's office Monday morning to arrange follow-up later this week.  He can further investigate the lung nodule and the hemangiomas found on today's CT.  Return to ED for worsening symptoms, medical emergencies.

## 2021-06-05 NOTE — ED PROVIDER NOTES
"Feliz Weller is a 89-year-old white female who presents stating \"I am sick\".  Patient has been spearing seeing abdominal pain x2 weeks.  Patient has frequent constipation.  She thought initially her symptoms were due to constipation.  However patient took a laxative.  Had a bowel movement 2 days ago without any significant improvement.  Symptoms actually been worsening the past 2 days.  Patient has taken Pepto-Bismol and Kaopectate without any improvement.  Associated nausea but no vomiting.  No fever or chills.  No dysuria.  She was seen at [facility] earlier today for the same symptoms.  They recommended patient come to the emergency room for evaluation.  Patient presents for evaluation.      History provided by:  Patient      Review of Systems   Constitutional: Negative.  Negative for fever.   HENT: Negative.  Negative for rhinorrhea.    Eyes: Negative.  Negative for redness.   Respiratory: Negative for cough.    Cardiovascular: Negative for chest pain.   Gastrointestinal: Positive for abdominal pain, constipation and nausea.   Endocrine: Negative.    Genitourinary: Negative.  Negative for difficulty urinating.   Musculoskeletal: Negative.  Negative for back pain.   Skin: Negative.  Negative for color change.   Neurological: Negative.  Negative for syncope.   Hematological: Negative.    Psychiatric/Behavioral: Negative.    All other systems reviewed and are negative.      Past Medical History:   Diagnosis Date   • Arthritis    • Asthma    • Breathing problem    • Cancer (CMS/HCC)    • Colon polyp    • Constipation    • Diabetes (CMS/HCC)    • Diverticulitis 2019   • Fatigue    • GERD (gastroesophageal reflux disease)    • Headache    • History of allergy    • Hypertension    • Irregular bowel habits    • Lichen sclerosus    • Pregnancy, ectopic    • Seizures (CMS/HCC)    • Shortness of breath    • Thyroid trouble        Allergies   Allergen Reactions   • Penicillins    • " Phenobarbital        Past Surgical History:   Procedure Laterality Date   • BREAST LUMPECTOMY     • CARDIAC CATHETERIZATION     • COLONOSCOPY     • HEMORRHOIDECTOMY     • HIP SURGERY  2011   • HYSTERECTOMY     • LYMPH NODE BIOPSY      excisional    • MASTECTOMY, PARTIAL Left    • OTHER SURGICAL HISTORY      Closed treatment acromioclavicular dislocation, manipulation   • TUBAL ABDOMINAL LIGATION      when she was 48       Family History   Problem Relation Age of Onset   • Emphysema Father    • Diabetes Other    • Hypertension Other    • Thyroid disease Other    • Colon cancer Neg Hx    • Colon polyps Neg Hx        Social History     Socioeconomic History   • Marital status:      Spouse name: Not on file   • Number of children: Not on file   • Years of education: Not on file   • Highest education level: Not on file   Tobacco Use   • Smoking status: Never Smoker   • Smokeless tobacco: Never Used   Vaping Use   • Vaping Use: Never used   Substance and Sexual Activity   • Alcohol use: No   • Drug use: Defer   • Sexual activity: Defer           Objective   Physical Exam  Vitals and nursing note reviewed.   Constitutional:       General: She is not in acute distress.     Appearance: She is well-developed and normal weight. She is not diaphoretic.      Comments: 89-year-old white female laying in bed.  Patient appears significantly younger than stated age.  She looks and good overall health for age.  Vital signs notable for BP of 216/83.  Otherwise unremarkable.  Patient is friendly and cooperative.   HENT:      Head: Normocephalic and atraumatic.      Right Ear: External ear normal.      Left Ear: External ear normal.      Nose: Nose normal.      Mouth/Throat:      Mouth: Mucous membranes are moist.      Pharynx: Oropharynx is clear.   Eyes:      Extraocular Movements: Extraocular movements intact.      Conjunctiva/sclera: Conjunctivae normal.      Pupils: Pupils are equal, round, and reactive to light.    Cardiovascular:      Rate and Rhythm: Normal rate and regular rhythm.      Heart sounds: Normal heart sounds. No murmur heard.   No friction rub. No gallop.    Pulmonary:      Effort: Pulmonary effort is normal. No respiratory distress.      Breath sounds: Normal breath sounds.   Abdominal:      General: Bowel sounds are normal. There is no distension.      Palpations: Abdomen is soft. There is no hepatomegaly, splenomegaly or pulsatile mass.      Tenderness: There is generalized abdominal tenderness (Minimal). There is no guarding or rebound. Negative signs include Lisa's sign, Rovsing's sign and McBurney's sign.      Hernia: No hernia is present.   Musculoskeletal:         General: Normal range of motion.      Cervical back: Normal range of motion and neck supple.   Skin:     General: Skin is warm and dry.   Neurological:      General: No focal deficit present.      Mental Status: She is alert and oriented to person, place, and time.   Psychiatric:         Mood and Affect: Mood normal.         Behavior: Behavior normal.         Procedures           ED Course  ED Course as of Jun 05 2019   Sat Jun 05, 2021   1625 Patient has mild diffuse tenderness to palpation.  Abdomen is nonsurgical.  Obtain full set of labs and CT abdomen pelvis with IV and oral contrast.  Giving nausea medication.  Patient declined offer for pain medication.    [SS]   1648 CBC is unremarkable.    [SS]   1658 Creatinine(!): 1.12 [SS]   1658 CMP notable for mildly elevated creatinine at 1.12.  GFR is 46.  Will start IV fluids prior to IV contrast for CT.   eGFR Non  Am(!): 46 [SS]   1714 Patient reports no improvement of nausea with Zofran.  However she declined additional antiemetics.  She is finished drinking 1 bottle of oral contrast.  Working on the second.  Discussed with patient lab results and indications for IV fluids.  Patient knowledges understanding.    [SS]   1920 CT shows diverticulosis without diverticulitis.  Nodule in  base of left lung not seen in 2019.  Suspected hemangiomas in vertebral bodies.  With patient's history radiologist cannot completely exclude metastasis.  I do not find any specific etiology for patient's abdominal pain.  Certainly no evidence of acute intra-abdominal pathology.  Will prescribe Bentyl and Zofran for home.  Discussed at length with patient and her caretaker all results, diagnoses, treatment, need for follow-up with her PCP and further evaluation of lung nodule and vertebral body hemangiomas.  Patient and caretaker acknowledge understanding.  Will DC home.Prescriptions1-Bentyl2-Zofran    [SS]      ED Course User Index  [SS] Tanmay Hussein MD      Labs Reviewed   COMPREHENSIVE METABOLIC PANEL - Abnormal; Notable for the following components:       Result Value    Glucose 102 (*)     Creatinine 1.12 (*)     Calcium 9.7 (*)     eGFR Non  Amer 46 (*)     All other components within normal limits    Narrative:     GFR Normal >60  Chronic Kidney Disease <60  Kidney Failure <15     LIPASE - Normal   CBC WITH AUTO DIFFERENTIAL - Normal   URINALYSIS W/ MICROSCOPIC IF INDICATED (NO CULTURE) - Normal    Narrative:     Urine microscopic not indicated.   RAINBOW DRAW    Narrative:     The following orders were created for panel order [place] Draw.  Procedure                               Abnormality         Status                     ---------                               -----------         ------                     Light Blue Top[[id number]]                                   Final result               Green Top (Gel)[[id number]]                                  Final result               Lavender Top[[id number]]                                     Final result               Gold Top - SST[[id number]]                                   Final result                 Please view results for these tests on the individual orders.   CBC AND DIFFERENTIAL    Narrative:     The following orders were created for  panel order CBC & Differential.  Procedure                               Abnormality         Status                     ---------                               -----------         ------                     CBC Auto Differential[[id number]]        Normal              Final result                 Please view results for these tests on the individual orders.   LIGHT BLUE TOP   GREEN TOP   LAVENDER TOP   GOLD TOP - SST     CT Abdomen Pelvis With Contrast    Result Date: 6/5/2021  Narrative: CT Abdomen Pelvis W INDICATION: Acute nonlocalized abdominal pain. History of breast cancer and diverticulitis. Prior hysterectomy. TECHNIQUE: CT of the abdomen and pelvis with IV contrast. Coronal and sagittal reconstructions were obtained.  Radiation dose reduction techniques included automated exposure control or exposure modulation based on body size. Count of known CT and cardiac nuc med studies performed in previous 12 months: 0. COMPARISON: CT abdomen pelvis 3/2/2019 FINDINGS: Abdomen: Questionable 7 mm lung nodule left lower lobe. There is some adjacent atelectasis. Liver, spleen and gallbladder are unremarkable. 1.7 cm low density and likely cystic lesion anterior to the body of the pancreas and appears unchanged from 2019. Stability favors benign etiology. The adrenal glands unremarkable. Large low density collection right renal pelvis likely representing a peripelvic cyst and appears unchanged. Multiple smaller renal cortical cysts. No renal stone or obstruction. Colonic diverticulosis without diverticulitis. Stomach and small bowel unremarkable. Aortic atherosclerotic changes without aneurysm. Pelvis: Bladder unremarkable. Uterus surgically absent. Moderate to severe L3-L4 spinal stenosis due to a combination of degenerative disc disease and facet arthropathy. Subtle low-attenuation lesion anterior aspect of the L1 vertebral body likely represents hemangioma but metastasis is not excluded. Additional low density lesion  within the T12 vertebral body could also represent hemangioma.     Impression: 7 mm noncalcified nodule left lower lobe. This was not clearly seen on prior CT abdomen and pelvis in 2019 and further workup and follow-up may be warranted given the history of breast cancer. Low-attenuation lesions T12 and L1 vertebral bodies could represent hemangiomas but metastases not excluded. Bone scan may be beneficial for further assessment. 1.7 cm cystic lesion anterior to the body the pancreas may represent a pseudocyst or benign cystic pancreatic neoplasm. Stability from 2019 favor benignity. Signer Name: ZEE Khan MD  Signed: 6/5/2021 7:04 PM  Workstation Name: [email or web address]  [facility]    My differential diagnosis for abdominal pain includes but is not limited to:  Gastritis, gastroenteritis, peptic ulcer disease, GERD, esophageal perforation, acute appendicitis, mesenteric adenitis, Meckel’s diverticulum, epiploic appendagitis, diverticulitis, colon cancer, ulcerative colitis, Crohn’s disease, intussusception, small bowel obstruction, adhesions, ischemic bowel, perforated viscus, ileus, obstipation, biliary colic, cholecystitis, cholelithiasis, Je-Julian Howard, hepatitis, pancreatitis, common bile duct obstruction, cholangitis, bile leak, splenic trauma, splenic rupture, splenic infarction, splenic abscess, abdominal abscess, ascites, spontaneous bacterial peritonitis, hernia, UTI, cystitis, prostatitis, ureterolithiasis, urinary obstruction, AAA, myocardial infarction, pneumonia, cancer, porphyria, DKA, medications, sickle cell, viral syndrome, zoster                                       MDM    Final diagnoses:   Generalized abdominal pain   Lung nodules   Hemangioma of spine       ED Disposition  ED Disposition     ED Disposition Condition Comment    Discharge Stable           Derrick Lipscomb MD  [address] [facility] [address] [phone]    Call in 2 days  Arrange follow-up  later this coming week.  Sooner if needed.         Medication List      New Prescriptions    dicyclomine 20 MG tablet  Commonly known as: BENTYL  Take 1 tablet by mouth Every 6 (Six) Hours As Needed (abdominal pain) for up to 20 doses.           Where to Get Your Medications      These medications were sent to [facility]. - [place], [place] - [address] [facility] - [phone]  - [phone] [address] [place] [address]    Phone: [phone]   · dicyclomine 20 MG tablet  · ondansetron 4 MG tablet          Tanmay Hussein MD  06/05/21 2019

## 2021-08-26 NOTE — PLAN OF CARE
Problem: Patient Care Overview  Goal: Discharge Needs Assessment  Outcome: Ongoing (interventions implemented as appropriate)   03/01/19 2764   Disability   Equipment Currently Used at Home none   Discharge Needs Assessment   Readmission Within the Last 30 Days no previous admission in last 30 days   Patient/Family Anticipates Transition to home with family   Patient/Family Anticipated Services at Transition none   Transportation Concerns car, none   Transportation Anticipated car, drives self;family or friend will provide         
Problem: Patient Care Overview  Goal: Individualization and Mutuality  Outcome: Ongoing (interventions implemented as appropriate)    Goal: Interprofessional Rounds/Family Conf  Outcome: Ongoing (interventions implemented as appropriate)      Problem: Fall Risk (Adult)  Goal: Absence of Fall  Outcome: Ongoing (interventions implemented as appropriate)      Problem: Skin Injury Risk (Adult)  Goal: Identify Related Risk Factors and Signs and Symptoms  Outcome: Ongoing (interventions implemented as appropriate)    Goal: Skin Health and Integrity  Outcome: Ongoing (interventions implemented as appropriate)      Problem: Breathing Pattern Ineffective (Adult)  Goal: Identify Related Risk Factors and Signs and Symptoms  Outcome: Ongoing (interventions implemented as appropriate)    Goal: Effective Oxygenation/Ventilation  Outcome: Ongoing (interventions implemented as appropriate)    Goal: Anxiety/Fear Reduction  Outcome: Ongoing (interventions implemented as appropriate)        
Problem: Patient Care Overview  Goal: Plan of Care Review  Outcome: Ongoing (interventions implemented as appropriate)      Problem: Fall Risk (Adult)  Goal: Absence of Fall  Outcome: Ongoing (interventions implemented as appropriate)      Problem: Skin Injury Risk (Adult)  Goal: Identify Related Risk Factors and Signs and Symptoms  Outcome: Ongoing (interventions implemented as appropriate)    Goal: Skin Health and Integrity  Outcome: Ongoing (interventions implemented as appropriate)      Problem: Breathing Pattern Ineffective (Adult)  Goal: Identify Related Risk Factors and Signs and Symptoms  Outcome: Ongoing (interventions implemented as appropriate)    Goal: Effective Oxygenation/Ventilation  Outcome: Ongoing (interventions implemented as appropriate)    Goal: Anxiety/Fear Reduction  Outcome: Ongoing (interventions implemented as appropriate)        
Problem: Patient Care Overview  Goal: Plan of Care Review  Outcome: Ongoing (interventions implemented as appropriate)   03/01/19 8420   Coping/Psychosocial   Plan of Care Reviewed With patient   OTHER   Outcome Summary pt up to chair.up ad jamie with sba, family at bedside, c/o intermittened abdominal pain, feels bloatd. ON iv abt for sepsis , tolerates well, no adverse reactions noted.     Goal: Individualization and Mutuality  Outcome: Ongoing (interventions implemented as appropriate)    Goal: Discharge Needs Assessment  Outcome: Ongoing (interventions implemented as appropriate)    Goal: Interprofessional Rounds/Family Conf  Outcome: Ongoing (interventions implemented as appropriate)      Problem: Fall Risk (Adult)  Goal: Identify Related Risk Factors and Signs and Symptoms  Outcome: Ongoing (interventions implemented as appropriate)    Goal: Absence of Fall  Outcome: Ongoing (interventions implemented as appropriate)      Problem: Skin Injury Risk (Adult)  Goal: Identify Related Risk Factors and Signs and Symptoms  Outcome: Ongoing (interventions implemented as appropriate)    Goal: Skin Health and Integrity  Outcome: Ongoing (interventions implemented as appropriate)        
Problem: Patient Care Overview  Goal: Plan of Care Review  Outcome: Ongoing (interventions implemented as appropriate)   03/02/19 0130   Coping/Psychosocial   Plan of Care Reviewed With patient   Plan of Care Review   Progress improving     Goal: Individualization and Mutuality  Outcome: Ongoing (interventions implemented as appropriate)      Problem: Breathing Pattern Ineffective (Adult)  Intervention: Optimize Oxygenation/Ventilation/Perfusion   03/02/19 0130   Respiratory Interventions   Airway/Ventilation Management airway patency maintained   Breathing Techniques/Airway Clearance deep/controlled cough encouraged   Positioning   Head of Bed (HOB) HOB elevated           
Problem: Patient Care Overview  Goal: Plan of Care Review  Outcome: Ongoing (interventions implemented as appropriate)   03/03/19 0437   Coping/Psychosocial   Plan of Care Reviewed With patient   Plan of Care Review   Progress improving     Goal: Individualization and Mutuality  Outcome: Ongoing (interventions implemented as appropriate)      Problem: Breathing Pattern Ineffective (Adult)  Intervention: Optimize Oxygenation/Ventilation/Perfusion   03/03/19 0437   Respiratory Interventions   Airway/Ventilation Management airway patency maintained;pulmonary hygiene promoted   Breathing Techniques/Airway Clearance deep/controlled cough encouraged           
Problem: Patient Care Overview  Goal: Plan of Care Review  Outcome: Ongoing (interventions implemented as appropriate)   03/04/19 0352   Coping/Psychosocial   Plan of Care Reviewed With patient   Plan of Care Review   Progress no change     Goal: Individualization and Mutuality  Outcome: Ongoing (interventions implemented as appropriate)      Problem: Breathing Pattern Ineffective (Adult)  Intervention: Optimize Oxygenation/Ventilation/Perfusion   03/04/19 0352   Respiratory Interventions   Airway/Ventilation Management airway patency maintained;pulmonary hygiene promoted           
Problem: Patient Care Overview  Goal: Plan of Care Review  Outcome: Ongoing (interventions implemented as appropriate)   03/04/19 1521   Coping/Psychosocial   Plan of Care Reviewed With patient   OTHER   Outcome Summary Pt up to chair and ambulate in sahu. VSS. IVs saline locked. Clear liquid diet.  C/O BLE pain. Doppler of BLE done today, negative. GI consult ordered   Plan of Care Review   Progress no change         
Problem: Patient Care Overview  Goal: Plan of Care Review  Outcome: Ongoing (interventions implemented as appropriate)   03/05/19 1143   Coping/Psychosocial   Plan of Care Reviewed With patient   OTHER   Outcome Summary Chart reviewed due to c/s for diet edu. Provided divirticular edu osis vs itis, low fiber during flare transition to high fiber once healed. Will cont to follow.          
Problem: Patient Care Overview  Goal: Plan of Care Review  Outcome: Outcome(s) achieved Date Met: 03/02/19      Problem: Fall Risk (Adult)  Goal: Identify Related Risk Factors and Signs and Symptoms  Outcome: Outcome(s) achieved Date Met: 03/02/19    Goal: Absence of Fall  Outcome: Outcome(s) achieved Date Met: 03/02/19      Problem: Skin Injury Risk (Adult)  Goal: Identify Related Risk Factors and Signs and Symptoms  Outcome: Outcome(s) achieved Date Met: 03/02/19    Goal: Skin Health and Integrity  Outcome: Outcome(s) achieved Date Met: 03/02/19      Problem: Breathing Pattern Ineffective (Adult)  Goal: Identify Related Risk Factors and Signs and Symptoms  Outcome: Outcome(s) achieved Date Met: 03/02/19    Goal: Effective Oxygenation/Ventilation  Outcome: Outcome(s) achieved Date Met: 03/02/19    Goal: Anxiety/Fear Reduction  Outcome: Outcome(s) achieved Date Met: 03/02/19        
Problem: Skin Injury Risk (Adult)  Goal: Skin Health and Integrity  Outcome: Ongoing (interventions implemented as appropriate)   03/03/19 1015   Skin Injury Risk (Adult)   Skin Health and Integrity making progress toward outcome  (skin intact, patient not itching at this time.)   Patient ambulating in the room.  Out of bed to chair, visiting with friend. Patient in good spirits.  No complaints at this time.      
Yes

## 2024-03-29 NOTE — PROGRESS NOTES
"SERVICE: [facility] HOSPITALIST    CONSULTANTS: Surgery, GI    CHIEF COMPLAINT: Follow-up diverticulitis    SUBJECTIVE: The patient reports she is feeling much better with only mild tenderness in her abdomen.  She did have an episode of nausea this morning that resolved with medication.  She continues to have bowel movements but much less frequent.  She is ambulating in hallways.  She complains of bilateral lower extremity swelling new today without any calf pain.  She otherwise denies f/c/cough/soa/chest pain/vomiting or other new concerns.    OBJECTIVE:    /70 (BP Location: Left arm, Patient Position: Lying)   Pulse 65   Temp 98.6 °F (37 °C) (Oral)   Resp 16   Ht 165.1 cm (65\")   Wt 82.7 kg (182 lb 6.4 oz)   SpO2 95%   BMI 30.35 kg/m²     MEDS/LABS REVIEWED AND ORDERED    budesonide-formoterol 2 puff Inhalation BID - RT   cyanocobalamin 1,000 mcg Intramuscular Weekly   enoxaparin 40 mg Subcutaneous Q24H   famotidine 20 mg Oral Daily   insulin aspart 0-9 Units Subcutaneous Q6H   lactobacillus acidophilus 1 capsule Oral Daily   [START ON 3/5/2019] levothyroxine 50 mcg Oral Q AM   meropenem 500 mg Intravenous Q12H   metroNIDAZOLE 500 mg Intravenous Q8H   montelukast 10 mg Oral Nightly   polyvinyl alcohol 1 drop Both Eyes BID   sodium chloride 3 mL Intravenous Q12H     Physical Exam   Constitutional: She is oriented to person, place, and time. She appears well-developed and well-nourished.   HENT:   Head: Normocephalic and atraumatic.   Eyes: EOM are normal. Pupils are equal, round, and reactive to light.   Cardiovascular: Normal rate and regular rhythm.   Pulmonary/Chest: Effort normal and breath sounds normal.   Abdominal: Bowel sounds are normal. She exhibits distension. There is no tenderness. There is no guarding.   Musculoskeletal:   1+ bilateral lower extremity pitting edema, negative Homans bilaterally   Neurological: She is alert and oriented to person, place, and time.   Skin: " Skin is warm and dry. No erythema.   Psychiatric: She has a normal mood and affect. Her behavior is normal.   Vitals reviewed.    LAB/DIAGNOSTICS:    Lab Results (last 24 hours)     Procedure Component Value Units Date/Time    POC Glucose Once [[id number]]  (Normal) Collected:  03/04/19 1238    Specimen:  Blood Updated:  03/04/19 1246     Glucose 82 mg/dL     POC Glucose Once [[id number]]  (Normal) Collected:  03/04/19 0618    Specimen:  Blood Updated:  03/04/19 0624     Glucose 83 mg/dL     Basic Metabolic Panel [[id number]]  (Abnormal) Collected:  03/04/19 0406    Specimen:  Blood Updated:  03/04/19 0532     Glucose 74 mg/dL      BUN 18 mg/dL      Creatinine 1.06 mg/dL      Sodium 141 mmol/L      Potassium 3.9 mmol/L      Chloride 106 mmol/L      CO2 22.8 mmol/L      Calcium 7.8 mg/dL      eGFR Non African Amer 49 mL/min/1.73      BUN/Creatinine Ratio 17.0     Anion Gap 12.2 mmol/L     Narrative:       The MDRD GFR formula is only valid for adults with stable renal function between ages 18 and 70.    CBC & Differential [[id number]] Collected:  03/04/19 0406    Specimen:  Blood Updated:  03/04/19 0507    Narrative:       The following orders were created for panel order CBC & Differential.  Procedure                               Abnormality         Status                     ---------                               -----------         ------                     CBC Auto Differential[[id number]]        Abnormal            Final result                 Please view results for these tests on the individual orders.    CBC Auto Differential [[id number]]  (Abnormal) Collected:  03/04/19 0406    Specimen:  Blood Updated:  03/04/19 0507     WBC 5.58 10*3/mm3      RBC 3.31 10*6/mm3      Hemoglobin 10.1 g/dL      Hematocrit 31.2 %      MCV 94.3 fL      MCH 30.5 pg      MCHC 32.4 g/dL      RDW 12.4 %      RDW-SD 42.9 fl      MPV 9.9 fL      Platelets 204 10*3/mm3      Neutrophil % 68.9 %      Lymphocyte % 17.7 %      Monocyte % 9.9  %      Eosinophil % 2.7 %      Basophil % 0.4 %      Immature Grans % 0.4 %      Neutrophils, Absolute 3.85 10*3/mm3      Lymphocytes, Absolute 0.99 10*3/mm3      Monocytes, Absolute 0.55 10*3/mm3      Eosinophils, Absolute 0.15 10*3/mm3      Basophils, Absolute 0.02 10*3/mm3      Immature Grans, Absolute 0.02 10*3/mm3      nRBC 0.0 /100 WBC     Blood Culture - Blood, Hand, Right [[id number]] Collected:  03/01/19 0235    Specimen:  Blood from Hand, Right Updated:  03/04/19 0245     Blood Culture No growth at 3 days    Blood Culture - Blood, Hand, Right [[id number]] Collected:  03/01/19 0210    Specimen:  Blood from Hand, Right Updated:  03/04/19 0230     Blood Culture No growth at 3 days    POC Glucose Once [[id number]]  (Normal) Collected:  03/03/19 2347    Specimen:  Blood Updated:  03/04/19 0014     Glucose 81 mg/dL     POC Glucose Once [[id number]]  (Normal) Collected:  03/03/19 1720    Specimen:  Blood Updated:  03/03/19 1728     Glucose 88 mg/dL         ECG 12 Lead   Final Result   HEART RATE= 67  bpm   RR Interval= 892  ms   IN Interval= 200  ms   P Horizontal Axis= 12  deg   P Front Axis= -4  deg   QRSD Interval= 89  ms   QT Interval= 434  ms   QRS Axis= 5  deg   T Wave Axis= -4  deg   - OTHERWISE NORMAL ECG -   Sinus rhythm   Atrial premature complex   Low voltage, precordial leads   No change from prior tracing   Electronically Signed By: Dilia Peter ([facility]) 01-Mar-2019 07:53:38   Date and Time of Study: 2019-03-01 07:04:38           Ct Abdomen Pelvis With Contrast    Result Date: 3/3/2019  Focal bowel wall thickening involving the small bowel in the left mid abdomen with surrounding inflammatory changes in the mesentery and a moderate amount of contained extraluminal gas. There are several diverticula in the region and this may represent an episode of acute small bowel diverticulitis. Though this is rare it is a known complication of small bowel diverticulosis. No evidence of abscess or drainable fluid  collection. No obstruction. This is felt to be clearly distinct from the left colon which is a more typical location for diverticulitis. Clinical and imaging follow-up to resolution recommended to exclude underlying mass.  These findings were called and discussed with Dr. Jenkins on call hospitalist at the time of this dictation.  This report was finalized on 3/3/2019 9:50 AM by Dr. LACEY Khan MD.      ASSESSMENT/PLAN:  Sepsis secondary to acute diverticulitis: Surgery following, GI consulted  Continues merrem/flagyl  Add probiotic  Consult dietitian to  for diverticular diet  Improving daily, monitor    Bilateral lower extremity edema: Check bilateral venous duplex, discontinue IV fluids, encourage oral intake, await results    DM 2:  Holding home medications, continue moderate dose sliding scale insulin with Accu-Cheks AC/at bedtime    Hypothyroidism: Resume home daily dose of levothyroxine 50 mcg    Hypertension: Blood pressure overall at goal off home medication, resume if needed    GERD: No current acute issues, add Pepcid daily    Normocytic anemia: Hemoglobin stable at 10.1, no active blood loss noted    CKD 3: Baseline creatinine approximately 1.1-1.2, currently 1.06, recheck in a.m. with discontinuation of IV fluid    OA: No current acute issues on tramadol as needed    Asthma: No acute issues on Symbicort, add home Singulair    Seizure disorder: No acute issues    B12 deficiency: Continue daily supplement     H/O Breast Cancer and Mastectomy: Resume home tamoxifen when able     PLAN FOR DISPOSITION: PEPE Sweet  Hospitalist, [facility]  03/04/19  12:47 PM         Unknown if ever smoked